# Patient Record
Sex: FEMALE | Race: BLACK OR AFRICAN AMERICAN | Employment: FULL TIME | URBAN - METROPOLITAN AREA
[De-identification: names, ages, dates, MRNs, and addresses within clinical notes are randomized per-mention and may not be internally consistent; named-entity substitution may affect disease eponyms.]

---

## 2018-02-21 PROBLEM — I10 HYPERTENSION: Status: ACTIVE | Noted: 2018-02-21

## 2018-02-21 PROBLEM — D62 ACUTE BLOOD LOSS ANEMIA: Status: ACTIVE | Noted: 2018-02-21

## 2018-02-21 PROBLEM — N92.1 MENOMETRORRHAGIA: Status: ACTIVE | Noted: 2018-02-21

## 2019-07-05 ENCOUNTER — HOSPITAL ENCOUNTER (OUTPATIENT)
Age: 44
Discharge: HOME | End: 2019-07-05
Payer: MEDICAID

## 2019-07-05 ENCOUNTER — HOSPITAL ENCOUNTER (OUTPATIENT)
Dept: GENERAL RADIOLOGY | Age: 44
Discharge: HOME | End: 2019-07-07
Payer: MEDICAID

## 2019-07-05 DIAGNOSIS — Z01.818 PREOP EXAMINATION: ICD-10-CM

## 2019-07-05 LAB
ALBUMIN SERPL-MCNC: 4.3 G/DL (ref 3.5–5.2)
ALP BLD-CCNC: 75 U/L (ref 35–104)
ALT SERPL-CCNC: 20 U/L (ref 0–32)
ANION GAP SERPL CALCULATED.3IONS-SCNC: 13 MMOL/L (ref 7–16)
APTT: 37.1 SEC (ref 24.5–35.1)
AST SERPL-CCNC: 17 U/L (ref 0–31)
BACTERIA: ABNORMAL /HPF
BILIRUB SERPL-MCNC: 0.3 MG/DL (ref 0–1.2)
BILIRUBIN URINE: NEGATIVE
BLOOD, URINE: ABNORMAL
BUN BLDV-MCNC: 5 MG/DL (ref 6–20)
CALCIUM SERPL-MCNC: 10.4 MG/DL (ref 8.6–10.2)
CHLORIDE BLD-SCNC: 107 MMOL/L (ref 98–107)
CLARITY: CLEAR
CO2: 21 MMOL/L (ref 22–29)
COLOR: YELLOW
CREAT SERPL-MCNC: 0.7 MG/DL (ref 0.5–1)
CRYSTALS, UA: ABNORMAL
EPITHELIAL CELLS, UA: ABNORMAL /HPF
GFR AFRICAN AMERICAN: >60
GFR NON-AFRICAN AMERICAN: >60 ML/MIN/1.73
GLUCOSE FASTING: 101 MG/DL (ref 74–99)
GLUCOSE URINE: NEGATIVE MG/DL
HCT VFR BLD CALC: 40.2 % (ref 34–48)
HEMOGLOBIN: 12.2 G/DL (ref 11.5–15.5)
INR BLD: 1
KETONES, URINE: NEGATIVE MG/DL
LEUKOCYTE ESTERASE, URINE: NEGATIVE
MCH RBC QN AUTO: 25.3 PG (ref 26–35)
MCHC RBC AUTO-ENTMCNC: 30.3 % (ref 32–34.5)
MCV RBC AUTO: 83.2 FL (ref 80–99.9)
NITRITE, URINE: NEGATIVE
PDW BLD-RTO: 19.2 FL (ref 11.5–15)
PH UA: 6 (ref 5–9)
PLATELET # BLD: 247 E9/L (ref 130–450)
PMV BLD AUTO: 11.4 FL (ref 7–12)
POTASSIUM SERPL-SCNC: 3.8 MMOL/L (ref 3.5–5)
PROTEIN UA: ABNORMAL MG/DL
PROTHROMBIN TIME: 11.4 SEC (ref 9.3–12.4)
RBC # BLD: 4.83 E12/L (ref 3.5–5.5)
RBC UA: ABNORMAL /HPF (ref 0–2)
SODIUM BLD-SCNC: 141 MMOL/L (ref 132–146)
SPECIFIC GRAVITY UA: 1.02 (ref 1–1.03)
TOTAL PROTEIN: 7.8 G/DL (ref 6.4–8.3)
UROBILINOGEN, URINE: 0.2 E.U./DL
WBC # BLD: 4.8 E9/L (ref 4.5–11.5)
WBC UA: ABNORMAL /HPF (ref 0–5)

## 2019-07-05 PROCEDURE — 81001 URINALYSIS AUTO W/SCOPE: CPT

## 2019-07-05 PROCEDURE — 85027 COMPLETE CBC AUTOMATED: CPT

## 2019-07-05 PROCEDURE — 85730 THROMBOPLASTIN TIME PARTIAL: CPT

## 2019-07-05 PROCEDURE — 85610 PROTHROMBIN TIME: CPT

## 2019-07-05 PROCEDURE — 71046 X-RAY EXAM CHEST 2 VIEWS: CPT

## 2019-07-05 PROCEDURE — 80053 COMPREHEN METABOLIC PANEL: CPT

## 2019-07-05 PROCEDURE — 36415 COLL VENOUS BLD VENIPUNCTURE: CPT

## 2019-07-15 ENCOUNTER — HOSPITAL ENCOUNTER (EMERGENCY)
Age: 44
Discharge: HOME | DRG: 519 | End: 2019-07-15
Payer: MEDICAID

## 2019-07-15 VITALS
SYSTOLIC BLOOD PRESSURE: 153 MMHG | OXYGEN SATURATION: 100 % | BODY MASS INDEX: 24.09 KG/M2 | RESPIRATION RATE: 17 BRPM | HEART RATE: 51 BPM | DIASTOLIC BLOOD PRESSURE: 81 MMHG | WEIGHT: 168.25 LBS | TEMPERATURE: 99.6 F | HEIGHT: 70 IN

## 2019-07-15 DIAGNOSIS — R10.30 LOWER ABDOMINAL PAIN: Primary | ICD-10-CM

## 2019-07-15 LAB
ALBUMIN SERPL-MCNC: 4 G/DL (ref 3.5–5.2)
ALP BLD-CCNC: 66 U/L (ref 35–104)
ALT SERPL-CCNC: 16 U/L (ref 0–32)
ANION GAP SERPL CALCULATED.3IONS-SCNC: 12 MMOL/L (ref 7–16)
AST SERPL-CCNC: 18 U/L (ref 0–31)
BACTERIA: ABNORMAL /HPF
BASOPHILS ABSOLUTE: 0.03 E9/L (ref 0–0.2)
BASOPHILS RELATIVE PERCENT: 0.6 % (ref 0–2)
BILIRUB SERPL-MCNC: <0.2 MG/DL (ref 0–1.2)
BILIRUBIN URINE: NEGATIVE
BLOOD, URINE: ABNORMAL
BUN BLDV-MCNC: 6 MG/DL (ref 6–20)
CALCIUM SERPL-MCNC: 10.1 MG/DL (ref 8.6–10.2)
CHLORIDE BLD-SCNC: 106 MMOL/L (ref 98–107)
CLARITY: CLEAR
CO2: 21 MMOL/L (ref 22–29)
COLOR: YELLOW
CREAT SERPL-MCNC: 0.8 MG/DL (ref 0.5–1)
EOSINOPHILS ABSOLUTE: 0.19 E9/L (ref 0.05–0.5)
EOSINOPHILS RELATIVE PERCENT: 3.5 % (ref 0–6)
EPITHELIAL CELLS, UA: ABNORMAL /HPF
GFR AFRICAN AMERICAN: >60
GFR NON-AFRICAN AMERICAN: >60 ML/MIN/1.73
GLUCOSE BLD-MCNC: 110 MG/DL (ref 74–99)
GLUCOSE URINE: NEGATIVE MG/DL
HCG(URINE) PREGNANCY TEST: NEGATIVE
HCT VFR BLD CALC: 38.2 % (ref 34–48)
HEMOGLOBIN: 11.6 G/DL (ref 11.5–15.5)
IMMATURE GRANULOCYTES #: 0.01 E9/L
IMMATURE GRANULOCYTES %: 0.2 % (ref 0–5)
KETONES, URINE: NEGATIVE MG/DL
LACTIC ACID: 2.9 MMOL/L (ref 0.5–2.2)
LEUKOCYTE ESTERASE, URINE: ABNORMAL
LIPASE: 23 U/L (ref 13–60)
LYMPHOCYTES ABSOLUTE: 1.93 E9/L (ref 1.5–4)
LYMPHOCYTES RELATIVE PERCENT: 35.7 % (ref 20–42)
MCH RBC QN AUTO: 26.1 PG (ref 26–35)
MCHC RBC AUTO-ENTMCNC: 30.4 % (ref 32–34.5)
MCV RBC AUTO: 85.8 FL (ref 80–99.9)
MONOCYTES ABSOLUTE: 0.41 E9/L (ref 0.1–0.95)
MONOCYTES RELATIVE PERCENT: 7.6 % (ref 2–12)
NEUTROPHILS ABSOLUTE: 2.83 E9/L (ref 1.8–7.3)
NEUTROPHILS RELATIVE PERCENT: 52.4 % (ref 43–80)
NITRITE, URINE: NEGATIVE
PDW BLD-RTO: 19.6 FL (ref 11.5–15)
PH UA: 6 (ref 5–9)
PLATELET # BLD: 280 E9/L (ref 130–450)
PMV BLD AUTO: 11.3 FL (ref 7–12)
POTASSIUM SERPL-SCNC: 3.8 MMOL/L (ref 3.5–5)
PROTEIN UA: ABNORMAL MG/DL
RBC # BLD: 4.45 E12/L (ref 3.5–5.5)
RBC UA: ABNORMAL /HPF (ref 0–2)
SODIUM BLD-SCNC: 139 MMOL/L (ref 132–146)
SPECIFIC GRAVITY UA: 1.01 (ref 1–1.03)
TOTAL PROTEIN: 7.3 G/DL (ref 6.4–8.3)
UROBILINOGEN, URINE: 0.2 E.U./DL
WBC # BLD: 5.4 E9/L (ref 4.5–11.5)
WBC UA: ABNORMAL /HPF (ref 0–5)

## 2019-07-15 PROCEDURE — 81001 URINALYSIS AUTO W/SCOPE: CPT

## 2019-07-15 PROCEDURE — 85025 COMPLETE CBC W/AUTO DIFF WBC: CPT

## 2019-07-15 PROCEDURE — 80053 COMPREHEN METABOLIC PANEL: CPT

## 2019-07-15 PROCEDURE — 99284 EMERGENCY DEPT VISIT MOD MDM: CPT

## 2019-07-15 PROCEDURE — 83690 ASSAY OF LIPASE: CPT

## 2019-07-15 PROCEDURE — 96374 THER/PROPH/DIAG INJ IV PUSH: CPT

## 2019-07-15 PROCEDURE — 83605 ASSAY OF LACTIC ACID: CPT

## 2019-07-15 PROCEDURE — 36415 COLL VENOUS BLD VENIPUNCTURE: CPT

## 2019-07-15 PROCEDURE — 6360000002 HC RX W HCPCS: Performed by: NURSE PRACTITIONER

## 2019-07-15 PROCEDURE — 2580000003 HC RX 258: Performed by: NURSE PRACTITIONER

## 2019-07-15 PROCEDURE — 81025 URINE PREGNANCY TEST: CPT

## 2019-07-15 RX ORDER — 0.9 % SODIUM CHLORIDE 0.9 %
1000 INTRAVENOUS SOLUTION INTRAVENOUS ONCE
Status: COMPLETED | OUTPATIENT
Start: 2019-07-15 | End: 2019-07-15

## 2019-07-15 RX ORDER — KETOROLAC TROMETHAMINE 15 MG/ML
15 INJECTION, SOLUTION INTRAMUSCULAR; INTRAVENOUS ONCE
Status: COMPLETED | OUTPATIENT
Start: 2019-07-15 | End: 2019-07-15

## 2019-07-15 RX ADMIN — KETOROLAC TROMETHAMINE 15 MG: 15 INJECTION, SOLUTION INTRAMUSCULAR; INTRAVENOUS at 16:47

## 2019-07-15 RX ADMIN — SODIUM CHLORIDE 1000 ML: 9 INJECTION, SOLUTION INTRAVENOUS at 16:47

## 2019-07-15 ASSESSMENT — PAIN SCALES - GENERAL
PAINLEVEL_OUTOF10: 7
PAINLEVEL_OUTOF10: 5
PAINLEVEL_OUTOF10: 9

## 2019-07-15 ASSESSMENT — PAIN DESCRIPTION - PAIN TYPE: TYPE: ACUTE PAIN

## 2019-07-15 ASSESSMENT — PAIN DESCRIPTION - ORIENTATION: ORIENTATION: LOWER

## 2019-07-15 ASSESSMENT — PAIN DESCRIPTION - ONSET: ONSET: ON-GOING

## 2019-07-15 ASSESSMENT — PAIN DESCRIPTION - FREQUENCY: FREQUENCY: CONTINUOUS

## 2019-07-15 ASSESSMENT — PAIN DESCRIPTION - DESCRIPTORS: DESCRIPTORS: DISCOMFORT

## 2019-07-15 ASSESSMENT — PAIN - FUNCTIONAL ASSESSMENT: PAIN_FUNCTIONAL_ASSESSMENT: PREVENTS OR INTERFERES SOME ACTIVE ACTIVITIES AND ADLS

## 2019-07-15 ASSESSMENT — PAIN DESCRIPTION - LOCATION: LOCATION: ABDOMEN;BACK

## 2019-07-15 NOTE — ED PROVIDER NOTES
[facility]    Department of Emergency Medicine   ED  Provider Note  Admit Date/RoomTime: 7/15/2019  4:14 PM  ED Room: 25/25  MRN: [id number]  Chief Complaint       Abdominal Pain (lower adb pain past several days radiates to back and left leg no vomiting or diarrhea scheduled for hyster 7 18 by dr Bubba Bernabe)    History of Present Illness   Source of history provided by:  patient. History/Exam Limitations: none. Celine Max is a 37 y.o. old female who has a past medical history of:   Past Medical History:   Diagnosis Date    Anemia     has had transfusions, iron infusions 3/2015    Anxiety     Chronic lower back pain     H/O exercise stress test 2012    not sure of date, at [facility] neg    History of blood transfusion     at [facility]    Hypertension     Pancreatitis 2015    treated by Dr Andres Robbins, [facility]    presents to the emergency department by private vehicle, for complaints of still present aching pain in the lower abdomen with radiation to her back and left leg which began 8 month(s) prior to arrival.   There has been similar episodes in the past with uterine fibroids and abnormal uterine bleeding. Patient states that she is scheduled for a hysterectomy with her gynecologist in 3 days. Since onset the symptoms have been worsening. The pain is associated with nothing pertinent. The pain is aggravated by none and relieved by nothing. There has been NO chills, cloudy urine, constipation, diarrhea, dysuria, headache, hematuria, sweating, urinary frequency, urinary incontinence, urinary urgency, vaginal discharge, vaginal itching, vomiting or fever. Her CT of the abdomen from 2/21/2018 was reviewed. GYN History: irregular periods. STD History: no history of PID, STD's. No LMP recorded. Patient has had an injection. Current Pregnancy: Unknown. Birth Control: None. Gravid Status: No obstetric history on file.     .  ROS   Pertinent positives and negatives are stated within HPI, all other systems reviewed and are negative. Past Surgical History:   Procedure Laterality Date    OTHER SURGICAL HISTORY  11/17/2016    Diagnostic Laparoscopy and Lysis of Adhesions    TRANSTHORACIC ECHOCARDIOGRAM  12/09/2015    EF 61 %  normal results    TUBAL LIGATION  2010   Social History:  reports that she has been smoking cigarettes. She has been smoking about 0.50 packs per day. She has never used smokeless tobacco. She reports that she drinks alcohol. She reports that she has current or past drug history. Drug: Marijuana. Family History: family history includes Cancer in her father; Diabetes in her mother; High Blood Pressure in her mother. Allergies: Morphine    Physical Exam           ED Triage Vitals [07/15/19 1556]   BP Temp Temp Source Pulse Resp SpO2 Height Weight   (!) 152/80 99.6 °F (37.6 °C) Oral 78 16 99 % 5' 10\" (1.778 m) 168 lb 4 oz (76.3 kg)      Oxygen Saturation Interpretation: Normal.    · General Appearance/Constitutional:  Alert, development consistent with age. · HEENT:  NC/NT. PERRLA. Airway patent. · Neck:  Supple. No lymphadenopathy. · Respiratory:  No retractions. Lungs Clear to auscultation and breath sounds equal.  · CV:  Regular rate and rhythm. · GI:  General Appearance: normal.         Bowel sounds: normal bowel sounds. Distension:  None. Tenderness: mild tenderness is present in the lower abdomen. Liver: non-tender. Spleen:  non-palpable and non-tender. Pulsatile Mass: absent. Hernia:  no inguinal or femoral hernias noted. · Back: CVA Tenderness: No.  · : Deferred  · Integument:  Normal turgor. Warm, dry, without visible rash, unless noted elsewhere. · Lymphatics: No edema, cap.refill <3sec. · Neurological:  Orientation age-appropriate. Motor functions intact.     Lab / Imaging Results   (All laboratory and radiology results have been personally reviewed by myself)  Labs:  Results for orders placed or

## 2019-07-17 ENCOUNTER — ANESTHESIA EVENT (OUTPATIENT)
Dept: OPERATING ROOM | Age: 44
DRG: 519 | End: 2019-07-17
Payer: MEDICAID

## 2019-07-17 NOTE — PROGRESS NOTES
[facility]                                                                                                                    PRE OP INSTRUCTIONS FOR  Sabino Snow        Date: 7/17/2019    Date of surgery: 7/18/2019   Arrival Time: Hospital will call you between 5pm and 7pm with your final arrival time for surgery    1. Do not eat or drink anything after midnight prior to surgery. This includes no water, chewing gum, mints or ice chips. 2. Take the following medications with a small sip of water on the morning of Surgery: none     3. Diabetics may take evening dose of insulin but none after midnight. If you feel symptomatic or low blood sugar morning of surgery drink 1-2 ounces of apple juice only. 4. Aspirin, Ibuprofen, Advil, Naproxen, Vitamin E and other Anti-inflammatory products should be stopped  before surgery  as directed by your physician. Take Tylenol only unless instructed otherwise by your surgeon. 5. Check with your Doctor regarding stopping Plavix, Coumadin, Lovenox, Eliquis, Effient, or other blood thinners. 6. Do not smoke,use illicit drugs and do not drink any alcoholic beverages 24 hours prior to surgery. 7. You may brush your teeth the morning of surgery. DO NOT SWALLOW WATER    8. You MUST make arrangements for a responsible adult to take you home after your surgery. You will not be allowed to leave alone or drive yourself home. It is strongly suggested someone stay with you the first 24 hrs. Your surgery will be cancelled if you do not have a ride home. 9. PEDIATRIC PATIENTS ONLY:  A parent/legal guardian must accompany a child scheduled for surgery and plan to stay at the hospital until the child is discharged. Please do not bring other children with you.     10. Please wear simple, loose fitting clothing to the hospital.  Reche Baseman not bring valuables (money, credit cards, checkbooks, etc.) Do not wear any makeup (including no eye makeup) or nail

## 2019-07-17 NOTE — H&P
Department of Gynecology  Attending Pre-operative History and Physical      Jae Martinez  7/17/2019  [id number]        CHIEF COMPLAINT:   Excessive bleeding and pain    History obtained from patient    HISTORY OF PRESENT ILLNESS:                      37 y.o. female with   history of excessive pain and bleeding who presents with enlargement of uterus with fibroids and now scheduled for SERGEI with BS The patient had thorough counselling about the procedure, the advantages and disadvantages complications consequences short-term-long-term.,all the questions were answered to her satisfaction. Pt desires to proceed with surgery as scheduled      Past Medical History:        Diagnosis Date    Anemia     has had transfusions, iron infusions 3/2015    Anxiety     Chronic lower back pain     H/O exercise stress test 2012    not sure of date, at [facility], neg    History of blood transfusion     at [facility]    Hypertension     Pancreatitis 2015    treated by Dr Carrol Payne, [facility]       Past Surgical History:        Procedure Laterality Date    OTHER SURGICAL HISTORY  11/17/2016    Diagnostic Laparoscopy and Lysis of Adhesions    TRANSTHORACIC ECHOCARDIOGRAM  12/09/2015    EF 61 %  normal results    TUBAL LIGATION  2010       Past Gynecological History:    1. Last menstrual period: 2wks ago  2. Menses: heavy,clotting  3. Pap History: normal Date: 05/19                    OB History   No data available       5 FTDels  Medications Prior to Admission:   No medications prior to admission.     Allergies:  Morphine     Social History:  Social History     Socioeconomic History    Marital status: Single     Spouse name: Not on file    Number of children: Not on file    Years of education: Not on file    Highest education level: Not on file   Occupational History    Not on file   Social Needs    Financial resource strain: Not on file    Food insecurity:     Worry: Not on file     Inability: Not on file    Transportation needs: complications and side effects.         Electronically signed by Alvin Gasca MD on 7/17/2019 at 6:04 PM

## 2019-07-18 ENCOUNTER — HOSPITAL ENCOUNTER (INPATIENT)
Age: 44
LOS: 2 days | Discharge: HOME | DRG: 519 | End: 2019-07-20
Attending: OBSTETRICS & GYNECOLOGY | Admitting: OBSTETRICS & GYNECOLOGY
Payer: MEDICAID

## 2019-07-18 ENCOUNTER — ANESTHESIA (OUTPATIENT)
Dept: OPERATING ROOM | Age: 44
DRG: 519 | End: 2019-07-18
Payer: MEDICAID

## 2019-07-18 VITALS
TEMPERATURE: 95.7 F | SYSTOLIC BLOOD PRESSURE: 134 MMHG | OXYGEN SATURATION: 100 % | RESPIRATION RATE: 1 BRPM | DIASTOLIC BLOOD PRESSURE: 80 MMHG

## 2019-07-18 DIAGNOSIS — Z90.710 STATUS POST ABDOMINAL HYSTERECTOMY: Primary | ICD-10-CM

## 2019-07-18 LAB
ABO/RH: NORMAL
AMPHETAMINE SCREEN, URINE: NOT DETECTED
ANTIBODY SCREEN: NORMAL
BARBITURATE SCREEN URINE: NOT DETECTED
BENZODIAZEPINE SCREEN, URINE: NOT DETECTED
CANNABINOID SCREEN URINE: POSITIVE
COCAINE METABOLITE SCREEN URINE: NOT DETECTED
HCG(URINE) PREGNANCY TEST: NEGATIVE
METHADONE SCREEN, URINE: NOT DETECTED
OPIATE SCREEN URINE: NOT DETECTED
PHENCYCLIDINE SCREEN URINE: NOT DETECTED
PROPOXYPHENE SCREEN: NOT DETECTED

## 2019-07-18 PROCEDURE — 6360000002 HC RX W HCPCS: Performed by: OBSTETRICS & GYNECOLOGY

## 2019-07-18 PROCEDURE — 1200000000 HC SEMI PRIVATE

## 2019-07-18 PROCEDURE — 7100000000 HC PACU RECOVERY - FIRST 15 MIN: Performed by: OBSTETRICS & GYNECOLOGY

## 2019-07-18 PROCEDURE — 2580000003 HC RX 258: Performed by: ANESTHESIOLOGY

## 2019-07-18 PROCEDURE — 0UT90ZZ RESECTION OF UTERUS, OPEN APPROACH: ICD-10-PCS | Performed by: OBSTETRICS & GYNECOLOGY

## 2019-07-18 PROCEDURE — 86850 RBC ANTIBODY SCREEN: CPT

## 2019-07-18 PROCEDURE — 2580000003 HC RX 258: Performed by: OBSTETRICS & GYNECOLOGY

## 2019-07-18 PROCEDURE — 6360000002 HC RX W HCPCS

## 2019-07-18 PROCEDURE — 86900 BLOOD TYPING SEROLOGIC ABO: CPT

## 2019-07-18 PROCEDURE — 3600000004 HC SURGERY LEVEL 4 BASE: Performed by: OBSTETRICS & GYNECOLOGY

## 2019-07-18 PROCEDURE — 93005 ELECTROCARDIOGRAM TRACING: CPT | Performed by: ANESTHESIOLOGY

## 2019-07-18 PROCEDURE — 86901 BLOOD TYPING SEROLOGIC RH(D): CPT

## 2019-07-18 PROCEDURE — 2500000003 HC RX 250 WO HCPCS: Performed by: ANESTHESIOLOGY

## 2019-07-18 PROCEDURE — 2720000010 HC SURG SUPPLY STERILE: Performed by: OBSTETRICS & GYNECOLOGY

## 2019-07-18 PROCEDURE — 3600000014 HC SURGERY LEVEL 4 ADDTL 15MIN: Performed by: OBSTETRICS & GYNECOLOGY

## 2019-07-18 PROCEDURE — 81025 URINE PREGNANCY TEST: CPT

## 2019-07-18 PROCEDURE — 0UT70ZZ RESECTION OF BILATERAL FALLOPIAN TUBES, OPEN APPROACH: ICD-10-PCS | Performed by: OBSTETRICS & GYNECOLOGY

## 2019-07-18 PROCEDURE — 2500000003 HC RX 250 WO HCPCS

## 2019-07-18 PROCEDURE — 36415 COLL VENOUS BLD VENIPUNCTURE: CPT

## 2019-07-18 PROCEDURE — 3700000000 HC ANESTHESIA ATTENDED CARE: Performed by: OBSTETRICS & GYNECOLOGY

## 2019-07-18 PROCEDURE — 6370000000 HC RX 637 (ALT 250 FOR IP): Performed by: OBSTETRICS & GYNECOLOGY

## 2019-07-18 PROCEDURE — 7100000001 HC PACU RECOVERY - ADDTL 15 MIN: Performed by: OBSTETRICS & GYNECOLOGY

## 2019-07-18 PROCEDURE — G0480 DRUG TEST DEF 1-7 CLASSES: HCPCS

## 2019-07-18 PROCEDURE — 2709999900 HC NON-CHARGEABLE SUPPLY: Performed by: OBSTETRICS & GYNECOLOGY

## 2019-07-18 PROCEDURE — 88307 TISSUE EXAM BY PATHOLOGIST: CPT

## 2019-07-18 PROCEDURE — 6360000002 HC RX W HCPCS: Performed by: ANESTHESIOLOGY

## 2019-07-18 PROCEDURE — 3700000001 HC ADD 15 MINUTES (ANESTHESIA): Performed by: OBSTETRICS & GYNECOLOGY

## 2019-07-18 PROCEDURE — 80307 DRUG TEST PRSMV CHEM ANLYZR: CPT

## 2019-07-18 RX ORDER — NEOSTIGMINE METHYLSULFATE 1 MG/ML
INJECTION, SOLUTION INTRAVENOUS PRN
Status: DISCONTINUED | OUTPATIENT
Start: 2019-07-18 | End: 2019-07-18 | Stop reason: SDUPTHER

## 2019-07-18 RX ORDER — MEPERIDINE HYDROCHLORIDE 25 MG/ML
12.5 INJECTION INTRAMUSCULAR; INTRAVENOUS; SUBCUTANEOUS EVERY 5 MIN PRN
Status: DISCONTINUED | OUTPATIENT
Start: 2019-07-18 | End: 2019-07-18 | Stop reason: HOSPADM

## 2019-07-18 RX ORDER — SODIUM CHLORIDE, SODIUM LACTATE, POTASSIUM CHLORIDE, CALCIUM CHLORIDE 600; 310; 30; 20 MG/100ML; MG/100ML; MG/100ML; MG/100ML
INJECTION, SOLUTION INTRAVENOUS CONTINUOUS
Status: DISCONTINUED | OUTPATIENT
Start: 2019-07-18 | End: 2019-07-20

## 2019-07-18 RX ORDER — FENTANYL CITRATE 50 UG/ML
INJECTION, SOLUTION INTRAMUSCULAR; INTRAVENOUS PRN
Status: DISCONTINUED | OUTPATIENT
Start: 2019-07-18 | End: 2019-07-18 | Stop reason: SDUPTHER

## 2019-07-18 RX ORDER — SODIUM CHLORIDE 0.9 % (FLUSH) 0.9 %
10 SYRINGE (ML) INJECTION PRN
Status: DISCONTINUED | OUTPATIENT
Start: 2019-07-18 | End: 2019-07-18 | Stop reason: HOSPADM

## 2019-07-18 RX ORDER — PROPOFOL 10 MG/ML
INJECTION, EMULSION INTRAVENOUS PRN
Status: DISCONTINUED | OUTPATIENT
Start: 2019-07-18 | End: 2019-07-18 | Stop reason: SDUPTHER

## 2019-07-18 RX ORDER — ONDANSETRON 2 MG/ML
INJECTION INTRAMUSCULAR; INTRAVENOUS PRN
Status: DISCONTINUED | OUTPATIENT
Start: 2019-07-18 | End: 2019-07-18 | Stop reason: SDUPTHER

## 2019-07-18 RX ORDER — HYDROMORPHONE HYDROCHLORIDE 1 MG/ML
0.5 INJECTION, SOLUTION INTRAMUSCULAR; INTRAVENOUS; SUBCUTANEOUS EVERY 5 MIN PRN
Status: COMPLETED | OUTPATIENT
Start: 2019-07-18 | End: 2019-07-18

## 2019-07-18 RX ORDER — SODIUM CHLORIDE 0.9 % (FLUSH) 0.9 %
10 SYRINGE (ML) INJECTION EVERY 12 HOURS SCHEDULED
Status: DISCONTINUED | OUTPATIENT
Start: 2019-07-18 | End: 2019-07-20 | Stop reason: HOSPADM

## 2019-07-18 RX ORDER — FENTANYL CITRATE 50 UG/ML
INJECTION, SOLUTION INTRAMUSCULAR; INTRAVENOUS
Status: DISPENSED
Start: 2019-07-18 | End: 2019-07-18

## 2019-07-18 RX ORDER — GLYCOPYRROLATE 1 MG/5 ML
SYRINGE (ML) INTRAVENOUS PRN
Status: DISCONTINUED | OUTPATIENT
Start: 2019-07-18 | End: 2019-07-18 | Stop reason: SDUPTHER

## 2019-07-18 RX ORDER — MIDAZOLAM HYDROCHLORIDE 1 MG/ML
INJECTION INTRAMUSCULAR; INTRAVENOUS PRN
Status: DISCONTINUED | OUTPATIENT
Start: 2019-07-18 | End: 2019-07-18 | Stop reason: SDUPTHER

## 2019-07-18 RX ORDER — ONDANSETRON 2 MG/ML
4 INJECTION INTRAMUSCULAR; INTRAVENOUS EVERY 8 HOURS PRN
Status: DISCONTINUED | OUTPATIENT
Start: 2019-07-18 | End: 2019-07-20 | Stop reason: HOSPADM

## 2019-07-18 RX ORDER — SODIUM CHLORIDE 0.9 % (FLUSH) 0.9 %
10 SYRINGE (ML) INJECTION EVERY 12 HOURS SCHEDULED
Status: DISCONTINUED | OUTPATIENT
Start: 2019-07-18 | End: 2019-07-18 | Stop reason: HOSPADM

## 2019-07-18 RX ORDER — OXYCODONE HYDROCHLORIDE AND ACETAMINOPHEN 5; 325 MG/1; MG/1
2 TABLET ORAL EVERY 4 HOURS PRN
Status: DISCONTINUED | OUTPATIENT
Start: 2019-07-18 | End: 2019-07-19

## 2019-07-18 RX ORDER — HYDROMORPHONE HYDROCHLORIDE 1 MG/ML
0.25 INJECTION, SOLUTION INTRAMUSCULAR; INTRAVENOUS; SUBCUTANEOUS EVERY 5 MIN PRN
Status: DISCONTINUED | OUTPATIENT
Start: 2019-07-18 | End: 2019-07-18 | Stop reason: HOSPADM

## 2019-07-18 RX ORDER — IBUPROFEN 800 MG/1
800 TABLET ORAL EVERY 6 HOURS PRN
Status: DISCONTINUED | OUTPATIENT
Start: 2019-07-18 | End: 2019-07-20 | Stop reason: HOSPADM

## 2019-07-18 RX ORDER — LIDOCAINE HYDROCHLORIDE 20 MG/ML
INJECTION, SOLUTION INTRAVENOUS PRN
Status: DISCONTINUED | OUTPATIENT
Start: 2019-07-18 | End: 2019-07-18 | Stop reason: SDUPTHER

## 2019-07-18 RX ORDER — DEXAMETHASONE SODIUM PHOSPHATE 4 MG/ML
INJECTION, SOLUTION INTRA-ARTICULAR; INTRALESIONAL; INTRAMUSCULAR; INTRAVENOUS; SOFT TISSUE PRN
Status: DISCONTINUED | OUTPATIENT
Start: 2019-07-18 | End: 2019-07-18 | Stop reason: SDUPTHER

## 2019-07-18 RX ORDER — MEPERIDINE HYDROCHLORIDE 25 MG/ML
INJECTION INTRAMUSCULAR; INTRAVENOUS; SUBCUTANEOUS
Status: DISPENSED
Start: 2019-07-18 | End: 2019-07-18

## 2019-07-18 RX ORDER — FENTANYL CITRATE 50 UG/ML
50 INJECTION, SOLUTION INTRAMUSCULAR; INTRAVENOUS EVERY 5 MIN PRN
Status: DISCONTINUED | OUTPATIENT
Start: 2019-07-18 | End: 2019-07-18 | Stop reason: HOSPADM

## 2019-07-18 RX ORDER — ROCURONIUM BROMIDE 10 MG/ML
INJECTION, SOLUTION INTRAVENOUS PRN
Status: DISCONTINUED | OUTPATIENT
Start: 2019-07-18 | End: 2019-07-18 | Stop reason: SDUPTHER

## 2019-07-18 RX ORDER — HYDROMORPHONE HYDROCHLORIDE 1 MG/ML
0.5 INJECTION, SOLUTION INTRAMUSCULAR; INTRAVENOUS; SUBCUTANEOUS
Status: DISCONTINUED | OUTPATIENT
Start: 2019-07-18 | End: 2019-07-19

## 2019-07-18 RX ORDER — NALOXONE HYDROCHLORIDE 0.4 MG/ML
0.4 INJECTION, SOLUTION INTRAMUSCULAR; INTRAVENOUS; SUBCUTANEOUS PRN
Status: DISCONTINUED | OUTPATIENT
Start: 2019-07-18 | End: 2019-07-19

## 2019-07-18 RX ORDER — SODIUM CHLORIDE, SODIUM LACTATE, POTASSIUM CHLORIDE, CALCIUM CHLORIDE 600; 310; 30; 20 MG/100ML; MG/100ML; MG/100ML; MG/100ML
INJECTION, SOLUTION INTRAVENOUS CONTINUOUS
Status: DISCONTINUED | OUTPATIENT
Start: 2019-07-18 | End: 2019-07-19

## 2019-07-18 RX ORDER — SODIUM CHLORIDE 0.9 % (FLUSH) 0.9 %
10 SYRINGE (ML) INJECTION PRN
Status: DISCONTINUED | OUTPATIENT
Start: 2019-07-18 | End: 2019-07-20 | Stop reason: HOSPADM

## 2019-07-18 RX ORDER — OXYCODONE HYDROCHLORIDE AND ACETAMINOPHEN 5; 325 MG/1; MG/1
1 TABLET ORAL EVERY 4 HOURS PRN
Status: DISCONTINUED | OUTPATIENT
Start: 2019-07-18 | End: 2019-07-19

## 2019-07-18 RX ADMIN — Medication 6 MG: at 22:36

## 2019-07-18 RX ADMIN — SODIUM CHLORIDE, POTASSIUM CHLORIDE, SODIUM LACTATE AND CALCIUM CHLORIDE: 600; 310; 30; 20 INJECTION, SOLUTION INTRAVENOUS at 08:51

## 2019-07-18 RX ADMIN — DEXAMETHASONE SODIUM PHOSPHATE 10 MG: 4 INJECTION, SOLUTION INTRA-ARTICULAR; INTRALESIONAL; INTRAMUSCULAR; INTRAVENOUS; SOFT TISSUE at 07:47

## 2019-07-18 RX ADMIN — HYDROMORPHONE HYDROCHLORIDE 0.5 MG: 1 INJECTION, SOLUTION INTRAMUSCULAR; INTRAVENOUS; SUBCUTANEOUS at 11:56

## 2019-07-18 RX ADMIN — FENTANYL CITRATE 50 MCG: 50 INJECTION, SOLUTION INTRAMUSCULAR; INTRAVENOUS at 08:35

## 2019-07-18 RX ADMIN — HYDROMORPHONE HYDROCHLORIDE 0.5 MG: 1 INJECTION, SOLUTION INTRAMUSCULAR; INTRAVENOUS; SUBCUTANEOUS at 11:15

## 2019-07-18 RX ADMIN — MIDAZOLAM 2 MG: 1 INJECTION INTRAMUSCULAR; INTRAVENOUS at 07:32

## 2019-07-18 RX ADMIN — HYDROMORPHONE HYDROCHLORIDE 0.5 MG: 1 INJECTION, SOLUTION INTRAMUSCULAR; INTRAVENOUS; SUBCUTANEOUS at 11:10

## 2019-07-18 RX ADMIN — SODIUM CHLORIDE, POTASSIUM CHLORIDE, SODIUM LACTATE AND CALCIUM CHLORIDE: 600; 310; 30; 20 INJECTION, SOLUTION INTRAVENOUS at 11:55

## 2019-07-18 RX ADMIN — FENTANYL CITRATE 50 MCG: 50 INJECTION, SOLUTION INTRAMUSCULAR; INTRAVENOUS at 08:06

## 2019-07-18 RX ADMIN — Medication 0.6 MG: at 09:29

## 2019-07-18 RX ADMIN — HYDROMORPHONE HYDROCHLORIDE 0.5 MG: 1 INJECTION, SOLUTION INTRAMUSCULAR; INTRAVENOUS; SUBCUTANEOUS at 11:00

## 2019-07-18 RX ADMIN — SODIUM CHLORIDE, POTASSIUM CHLORIDE, SODIUM LACTATE AND CALCIUM CHLORIDE: 600; 310; 30; 20 INJECTION, SOLUTION INTRAVENOUS at 07:31

## 2019-07-18 RX ADMIN — HYDROMORPHONE HYDROCHLORIDE 0.5 MG: 1 INJECTION, SOLUTION INTRAMUSCULAR; INTRAVENOUS; SUBCUTANEOUS at 10:45

## 2019-07-18 RX ADMIN — FENTANYL CITRATE 50 MCG: 50 INJECTION, SOLUTION INTRAMUSCULAR; INTRAVENOUS at 10:05

## 2019-07-18 RX ADMIN — FENTANYL CITRATE 100 MCG: 50 INJECTION, SOLUTION INTRAMUSCULAR; INTRAVENOUS at 09:39

## 2019-07-18 RX ADMIN — MEPERIDINE HYDROCHLORIDE 12.5 MG: 25 INJECTION, SOLUTION INTRAMUSCULAR; INTRAVENOUS; SUBCUTANEOUS at 10:23

## 2019-07-18 RX ADMIN — FENTANYL CITRATE 50 MCG: 50 INJECTION, SOLUTION INTRAMUSCULAR; INTRAVENOUS at 09:35

## 2019-07-18 RX ADMIN — HYDROMORPHONE HYDROCHLORIDE 0.25 MG: 1 INJECTION, SOLUTION INTRAMUSCULAR; INTRAVENOUS; SUBCUTANEOUS at 10:20

## 2019-07-18 RX ADMIN — FENTANYL CITRATE 50 MCG: 50 INJECTION, SOLUTION INTRAMUSCULAR; INTRAVENOUS at 10:10

## 2019-07-18 RX ADMIN — OXYCODONE HYDROCHLORIDE AND ACETAMINOPHEN 2 TABLET: 5; 325 TABLET ORAL at 12:58

## 2019-07-18 RX ADMIN — MEPERIDINE HYDROCHLORIDE 12.5 MG: 25 INJECTION, SOLUTION INTRAMUSCULAR; INTRAVENOUS; SUBCUTANEOUS at 10:18

## 2019-07-18 RX ADMIN — LIDOCAINE HYDROCHLORIDE 80 MG: 20 INJECTION, SOLUTION INTRAVENOUS at 07:37

## 2019-07-18 RX ADMIN — FENTANYL CITRATE 100 MCG: 50 INJECTION, SOLUTION INTRAMUSCULAR; INTRAVENOUS at 07:37

## 2019-07-18 RX ADMIN — HYDROMORPHONE HYDROCHLORIDE 0.25 MG: 1 INJECTION, SOLUTION INTRAMUSCULAR; INTRAVENOUS; SUBCUTANEOUS at 10:15

## 2019-07-18 RX ADMIN — ONDANSETRON HYDROCHLORIDE 4 MG: 2 INJECTION, SOLUTION INTRAMUSCULAR; INTRAVENOUS at 09:20

## 2019-07-18 RX ADMIN — Medication 20 MG: at 07:54

## 2019-07-18 RX ADMIN — PROPOFOL 200 MG: 10 INJECTION, EMULSION INTRAVENOUS at 07:37

## 2019-07-18 RX ADMIN — Medication 2 G: at 07:32

## 2019-07-18 RX ADMIN — Medication 50 MG: at 07:37

## 2019-07-18 RX ADMIN — Medication 10 MG: at 08:56

## 2019-07-18 RX ADMIN — SODIUM CHLORIDE, POTASSIUM CHLORIDE, SODIUM LACTATE AND CALCIUM CHLORIDE: 600; 310; 30; 20 INJECTION, SOLUTION INTRAVENOUS at 06:20

## 2019-07-18 RX ADMIN — Medication 0.2 MG: at 16:50

## 2019-07-18 RX ADMIN — HYDROMORPHONE HYDROCHLORIDE 0.5 MG: 1 INJECTION, SOLUTION INTRAMUSCULAR; INTRAVENOUS; SUBCUTANEOUS at 14:57

## 2019-07-18 RX ADMIN — FENTANYL CITRATE 100 MCG: 50 INJECTION, SOLUTION INTRAMUSCULAR; INTRAVENOUS at 08:14

## 2019-07-18 RX ADMIN — Medication 3 MG: at 09:29

## 2019-07-18 ASSESSMENT — PULMONARY FUNCTION TESTS
PIF_VALUE: 3
PIF_VALUE: 18
PIF_VALUE: 2
PIF_VALUE: 19
PIF_VALUE: 18
PIF_VALUE: 18
PIF_VALUE: 16
PIF_VALUE: 2
PIF_VALUE: 17
PIF_VALUE: 18
PIF_VALUE: 17
PIF_VALUE: 16
PIF_VALUE: 2
PIF_VALUE: 16
PIF_VALUE: 18
PIF_VALUE: 1
PIF_VALUE: 29
PIF_VALUE: 18
PIF_VALUE: 16
PIF_VALUE: 1
PIF_VALUE: 18
PIF_VALUE: 18
PIF_VALUE: 19
PIF_VALUE: 0
PIF_VALUE: 22
PIF_VALUE: 17
PIF_VALUE: 2
PIF_VALUE: 18
PIF_VALUE: 14
PIF_VALUE: 18
PIF_VALUE: 18
PIF_VALUE: 3
PIF_VALUE: 18
PIF_VALUE: 16
PIF_VALUE: 22
PIF_VALUE: 15
PIF_VALUE: 18
PIF_VALUE: 18
PIF_VALUE: 16
PIF_VALUE: 18
PIF_VALUE: 19
PIF_VALUE: 18
PIF_VALUE: 18
PIF_VALUE: 14
PIF_VALUE: 28
PIF_VALUE: 2
PIF_VALUE: 18
PIF_VALUE: 19
PIF_VALUE: 17
PIF_VALUE: 16
PIF_VALUE: 16
PIF_VALUE: 18
PIF_VALUE: 0
PIF_VALUE: 17
PIF_VALUE: 18
PIF_VALUE: 16
PIF_VALUE: 17
PIF_VALUE: 17
PIF_VALUE: 18
PIF_VALUE: 16
PIF_VALUE: 18
PIF_VALUE: 14
PIF_VALUE: 2
PIF_VALUE: 18
PIF_VALUE: 15
PIF_VALUE: 2
PIF_VALUE: 0
PIF_VALUE: 18
PIF_VALUE: 17
PIF_VALUE: 18
PIF_VALUE: 24
PIF_VALUE: 2
PIF_VALUE: 13
PIF_VALUE: 18
PIF_VALUE: 17
PIF_VALUE: 17
PIF_VALUE: 18
PIF_VALUE: 16
PIF_VALUE: 18
PIF_VALUE: 17
PIF_VALUE: 16
PIF_VALUE: 0
PIF_VALUE: 18
PIF_VALUE: 18
PIF_VALUE: 16
PIF_VALUE: 16
PIF_VALUE: 17
PIF_VALUE: 18
PIF_VALUE: 0
PIF_VALUE: 18
PIF_VALUE: 0
PIF_VALUE: 2
PIF_VALUE: 1
PIF_VALUE: 18
PIF_VALUE: 2
PIF_VALUE: 18
PIF_VALUE: 16
PIF_VALUE: 17
PIF_VALUE: 18
PIF_VALUE: 18
PIF_VALUE: 0
PIF_VALUE: 18
PIF_VALUE: 16
PIF_VALUE: 16
PIF_VALUE: 19
PIF_VALUE: 17
PIF_VALUE: 18
PIF_VALUE: 2
PIF_VALUE: 18
PIF_VALUE: 16
PIF_VALUE: 14
PIF_VALUE: 16
PIF_VALUE: 16
PIF_VALUE: 18
PIF_VALUE: 17
PIF_VALUE: 18
PIF_VALUE: 18
PIF_VALUE: 17
PIF_VALUE: 18
PIF_VALUE: 16
PIF_VALUE: 17
PIF_VALUE: 18
PIF_VALUE: 18

## 2019-07-18 ASSESSMENT — PAIN SCALES - GENERAL
PAINLEVEL_OUTOF10: 10
PAINLEVEL_OUTOF10: 9
PAINLEVEL_OUTOF10: 9
PAINLEVEL_OUTOF10: 10
PAINLEVEL_OUTOF10: 9
PAINLEVEL_OUTOF10: 5
PAINLEVEL_OUTOF10: 6
PAINLEVEL_OUTOF10: 9
PAINLEVEL_OUTOF10: 10
PAINLEVEL_OUTOF10: 8
PAINLEVEL_OUTOF10: 6
PAINLEVEL_OUTOF10: 9
PAINLEVEL_OUTOF10: 8
PAINLEVEL_OUTOF10: 10
PAINLEVEL_OUTOF10: 6
PAINLEVEL_OUTOF10: 5
PAINLEVEL_OUTOF10: 10
PAINLEVEL_OUTOF10: 6
PAINLEVEL_OUTOF10: 10
PAINLEVEL_OUTOF10: 10
PAINLEVEL_OUTOF10: 0
PAINLEVEL_OUTOF10: 9

## 2019-07-18 ASSESSMENT — PAIN DESCRIPTION - DESCRIPTORS
DESCRIPTORS: CRAMPING;SHARP
DESCRIPTORS: ACHING;BURNING;CONSTANT
DESCRIPTORS: CONSTANT;SHARP;SHOOTING;STABBING
DESCRIPTORS: ACHING;BURNING;CONSTANT
DESCRIPTORS: ACHING
DESCRIPTORS: CONSTANT;SHARP;SHOOTING
DESCRIPTORS: CONSTANT;SHARP;SHOOTING;STABBING;THROBBING

## 2019-07-18 ASSESSMENT — PAIN DESCRIPTION - LOCATION
LOCATION: ABDOMEN;BACK
LOCATION: ABDOMEN

## 2019-07-18 ASSESSMENT — PAIN DESCRIPTION - PROGRESSION
CLINICAL_PROGRESSION: GRADUALLY WORSENING
CLINICAL_PROGRESSION: GRADUALLY IMPROVING
CLINICAL_PROGRESSION: GRADUALLY WORSENING
CLINICAL_PROGRESSION: NOT CHANGED
CLINICAL_PROGRESSION: GRADUALLY WORSENING
CLINICAL_PROGRESSION: GRADUALLY IMPROVING

## 2019-07-18 ASSESSMENT — PAIN DESCRIPTION - ORIENTATION
ORIENTATION: MID
ORIENTATION: MID;LOWER

## 2019-07-18 ASSESSMENT — LIFESTYLE VARIABLES: SMOKING_STATUS: 1

## 2019-07-18 ASSESSMENT — PAIN - FUNCTIONAL ASSESSMENT
PAIN_FUNCTIONAL_ASSESSMENT: PREVENTS OR INTERFERES SOME ACTIVE ACTIVITIES AND ADLS
PAIN_FUNCTIONAL_ASSESSMENT: PREVENTS OR INTERFERES WITH MANY ACTIVE NOT PASSIVE ACTIVITIES
PAIN_FUNCTIONAL_ASSESSMENT: 0-10
PAIN_FUNCTIONAL_ASSESSMENT: PREVENTS OR INTERFERES SOME ACTIVE ACTIVITIES AND ADLS

## 2019-07-18 ASSESSMENT — PAIN DESCRIPTION - ONSET
ONSET: GRADUAL
ONSET: ON-GOING
ONSET: GRADUAL
ONSET: GRADUAL

## 2019-07-18 ASSESSMENT — PAIN DESCRIPTION - PAIN TYPE
TYPE: SURGICAL PAIN

## 2019-07-18 ASSESSMENT — PAIN DESCRIPTION - FREQUENCY
FREQUENCY: CONTINUOUS

## 2019-07-18 NOTE — ANESTHESIA PRE PROCEDURE
07/15/2019    K 3.8 07/15/2019     07/15/2019    CO2 21 07/15/2019    BUN 6 07/15/2019    CREATININE 0.8 07/15/2019    GFRAA >60 07/15/2019    LABGLOM >60 07/15/2019    GLUCOSE 110 07/15/2019    GLUCOSE 92 10/31/2010    PROT 7.3 07/15/2019    CALCIUM 10.1 07/15/2019    BILITOT <0.2 07/15/2019    ALKPHOS 66 07/15/2019    AST 18 07/15/2019    ALT 16 07/15/2019       POC Tests: No results for input(s): POCGLU, POCNA, POCK, POCCL, POCBUN, POCHEMO, POCHCT in the last 72 hours. Coags:   Lab Results   Component Value Date    PROTIME 11.4 07/05/2019    INR 1.0 07/05/2019    APTT 37.1 07/05/2019       HCG (If Applicable):   Lab Results   Component Value Date    PREGTESTUR NEGATIVE 07/18/2019        ABGs: No results found for: PHART, PO2ART, TOH1KAF, DPE4NOY, BEART, F9WZGBKH     Type & Screen (If Applicable):  No results found for: LABABO, LABRH    EKG 7/18/19  Narrative     Sinus bradycardia  Otherwise normal ECG  When compared with ECG of 17-NOV-2016 08:58,  No significant change was found     CXR 7/5/19  Impression   No acute cardiopulmonary disease.           Anesthesia Evaluation  Patient summary reviewed and Nursing notes reviewed no history of anesthetic complications:   Airway: Mallampati: III  TM distance: >3 FB   Neck ROM: full  Mouth opening: > = 3 FB Dental:      Comment: Pt has very poor dentition, was reluctant to open her mouth for exam, denies chipped or loose teeth     Pulmonary: breath sounds clear to auscultation  (+) current smoker          Patient did not smoke on day of surgery. ROS comment: Ch Marijuana abuse, she claimed that she did not use it today 7/18/19. Cardiovascular:    (+) hypertension:,       ECG reviewed  Rhythm: regular  Rate: normal           Beta Blocker:  Not on Beta Blocker         Neuro/Psych:   Negative Neuro/Psych ROS  (+) depression/anxiety             GI/Hepatic/Renal: Neg GI/Hepatic/Renal ROS           ROS comment: Hx of pancreatitis .    Endo/Other:

## 2019-07-18 NOTE — OP NOTE
PREOPERATIVE DIAGNOSES: Enlarged uterus, menometrorrhagia, fibroid uterus     POSTOPERATIVE DIAGNOSES:uterine fibroids      PROCEDURES:       . Total abdominal hysterectomy, bilateral salpingectomy.     ESTIMATED BLOOD LOSS: Approximately 100 mL.      URINE OUTPUT: 850 mL,       INTRAVENOUS FLUIDS: 2 L.     COMPLICATIONS: None.     PROCEDURE IN BRIEF: Patient under general anesthesia in supine position the patient is given a supine  position thereafter and parts were painted and draped as usual. Vertical  midline incision made infraumbilically. Abdomen is opened in layers in a  standard manner. The fascia layer is very thick and scarred. Knife was used  to incise through the whole layer and then the peritoneum is opened bluntly  and incision is extended cephalad as well as caudally without any problems. While extending the incision caudally, the uterus is enlarged approximately 16 weeks size with multiple uterine fibroids  At this point, 2 straight Darnell clamps were placed at the cornual ends on either side first the round ligament on the left side is ongoing so blatantly fighting with each other man is  clamped, cut medial to the clamp, ligated with the help of 0 Vicryl in the  form of Darnell stitch. Similarly on the left side, round ligament could not  be seen very distinctly, and once again the fold over there is clamped, cut  medial to the clamp, ligated with the help of 0 Vicryl in the form of Darnell  stitch. There is vesicouterine fold is incised from right round ligament down to bladder and towards the left round ligament. Bladder is pushed away from the underlying uterus endocervix and a blunt and sharp dissection.   Right ovary is  from the tube patient had a tubal sterilization in the past so the distal segment of the right tube is clamped at the mesosalpinx and that is cut and medial to the clamp and ligated with the help of 0 Vicryl in the form of any stitch right segment of the tube is removed handed over to the nurse similarly left tube is also  and handed over to the nurse after removing. Both the ovaries appear unremarkable. Plan is to leave the ovaries behind. At this point another clamp is placed at the coronal and on the left side and cut medial to the clamp ligated with the help of 0 Vicryl in the form of any stitch similarly it is done on the other side and the Darnell at the cornual ends are put on stretch.     the fundus and the uterus is put on stretch., and now the  uterines on the right side is skeletonized and clamped in 2-3 pedicles  separately and cut medial to the clamp and ligated with the help of 0 Vicryl  in the form of Darnell stitch individually. Similarly, it is done on the other  side. Then the cardinal ligament on the right side is clamped, cut medial to  the clamp, ligated with the help of 0 Vicryl in the form of Darnell stitch. Similarly, it is done on the other side. Uterosacral ligament on the right  side is clamped, cut medial to the clamp, ligated with the help of 0 Vicryl in  the form of Darnell stitch. Similarly, it is done on the other side.      Cervix is reasonably long and uterus is excised at the level of supracervix  and handed over to the nurse. Two tenaculums are placed on the cervix. Cervix is put on stretch. Kochers are placed on anterior aspect of the top of  the vagina. An incision is and the vagina is opened. Allis clamp is placed  and incision is carried around in a circumferential manner. Cervix is   and handed over to the nurse. Allis clamps are placed on the edges  of the vagina. Angles of the vagina are sutured individually with the help of  0 Vicryl in the form of figure-of-eight stitch on the right side as well as on  the left side. Anterior edge of the vagina is sutured continuous with locking  stitches.  Posteriorly, it is also sutured with the help of 0 Vicryl  continuous locking sutures and then anterior and posterior vaginal walls are  placed together by single figure-of-eight stitch. Hemostasis is observed  completely. All the pedicles are hemostatic at present, and then hemostatic  agent SNoW is placed on the vaginal vault and on the left side as well as on  the right side where the other pedicles are. Tapes are removed. O'Maico-O'Rivas retractor is also removed. Tape counts are correct. Needle and instrument counts are correct. At this point, bowels are brought  down into the field and abdomen is closed in layers. Peritoneum is placed  together with the help of 2-0 Vicryl continuous nonlocking stitches and then  the fascia is placed together with help of 0 PDS continuous nonlocking  stitches with the help of 2 pieces of suture material and subcutaneous fat is  placed together with help of interrupted stitches and skin is stapled together  and then aquasol dressing placed  point, the patient is coming out of anesthesia. The patient tolerated  procedure very well. The patient is transferred to recovery room with stable  vital signs in stable condition.

## 2019-07-18 NOTE — ANESTHESIA POSTPROCEDURE EVALUATION
Department of Anesthesiology  Postprocedure Note    Patient: Kiersten Wilkerson  MRN: [id number]  YOB: 1975  Date of evaluation: 7/18/2019  Time:  1:00 PM     Procedure Summary     Date:  07/18/19 Room / Location:  [facility] 05 / [id number] [address] [address]    Anesthesia Start:  0732 Anesthesia Stop:  6265    Procedure:  TOTAL ABDOMINAL HYSTERECTOMY WITH BILATERAL SALPINGCTOMY (N/A ) Diagnosis:  (ANEMIA MENOMETRORRHAGIA)    Surgeon:  Willie Dale MD Responsible Provider:  Barbra Cruz MD    Anesthesia Type:  general ASA Status:  2          Anesthesia Type: general    Hilario Phase I: Hilario Score: 10    Hilario Phase II:      Last vitals: Reviewed and per EMR flowsheets.        Anesthesia Post Evaluation    Patient location during evaluation: PACU  Patient participation: complete - patient participated  Level of consciousness: awake  Pain score: 0  Airway patency: patent  Nausea & Vomiting: no nausea  Complications: no  Cardiovascular status: blood pressure returned to baseline  Respiratory status: acceptable  Hydration status: euvolemic

## 2019-07-18 NOTE — PROGRESS NOTES
Restless medicated frequently for c/o pain with some relief Emotional support given Dr. Jeremiah Garcia at bedside small amount sang. Drainage noted from upper right and left side dressing Reinforced with no further drainage noted Small amount sang drainge noted vaginally.  Report called to RN 3rd floor

## 2019-07-19 LAB
HCT VFR BLD CALC: 38 % (ref 34–48)
HEMOGLOBIN: 12.2 G/DL (ref 11.5–15.5)

## 2019-07-19 PROCEDURE — 6360000002 HC RX W HCPCS: Performed by: OBSTETRICS & GYNECOLOGY

## 2019-07-19 PROCEDURE — 1200000000 HC SEMI PRIVATE

## 2019-07-19 PROCEDURE — 85014 HEMATOCRIT: CPT

## 2019-07-19 PROCEDURE — 6370000000 HC RX 637 (ALT 250 FOR IP): Performed by: OBSTETRICS & GYNECOLOGY

## 2019-07-19 PROCEDURE — 36415 COLL VENOUS BLD VENIPUNCTURE: CPT

## 2019-07-19 PROCEDURE — 85018 HEMOGLOBIN: CPT

## 2019-07-19 PROCEDURE — 2580000003 HC RX 258: Performed by: OBSTETRICS & GYNECOLOGY

## 2019-07-19 RX ORDER — HYDROMORPHONE HYDROCHLORIDE 4 MG/1
4 TABLET ORAL EVERY 4 HOURS PRN
Status: DISCONTINUED | OUTPATIENT
Start: 2019-07-19 | End: 2019-07-20 | Stop reason: HOSPADM

## 2019-07-19 RX ORDER — BISACODYL 10 MG
10 SUPPOSITORY, RECTAL RECTAL ONCE
Status: COMPLETED | OUTPATIENT
Start: 2019-07-19 | End: 2019-07-19

## 2019-07-19 RX ORDER — HYDROMORPHONE HYDROCHLORIDE 2 MG/1
2 TABLET ORAL EVERY 4 HOURS PRN
Status: DISCONTINUED | OUTPATIENT
Start: 2019-07-19 | End: 2019-07-20 | Stop reason: HOSPADM

## 2019-07-19 RX ORDER — ZOLPIDEM TARTRATE 5 MG/1
5 TABLET ORAL NIGHTLY PRN
Status: DISCONTINUED | OUTPATIENT
Start: 2019-07-19 | End: 2019-07-20 | Stop reason: HOSPADM

## 2019-07-19 RX ADMIN — HYDROMORPHONE HYDROCHLORIDE 4 MG: 4 TABLET ORAL at 10:00

## 2019-07-19 RX ADMIN — Medication 10 ML: at 20:09

## 2019-07-19 RX ADMIN — HYDROMORPHONE HYDROCHLORIDE 4 MG: 4 TABLET ORAL at 14:14

## 2019-07-19 RX ADMIN — ZOLPIDEM TARTRATE 5 MG: 5 TABLET ORAL at 20:09

## 2019-07-19 RX ADMIN — HYDROMORPHONE HYDROCHLORIDE 4 MG: 4 TABLET ORAL at 22:29

## 2019-07-19 RX ADMIN — BISACODYL 10 MG: 10 SUPPOSITORY RECTAL at 20:09

## 2019-07-19 RX ADMIN — Medication 6 MG: at 04:16

## 2019-07-19 RX ADMIN — HYDROMORPHONE HYDROCHLORIDE 4 MG: 4 TABLET ORAL at 18:25

## 2019-07-19 ASSESSMENT — PAIN SCALES - GENERAL
PAINLEVEL_OUTOF10: 8
PAINLEVEL_OUTOF10: 3
PAINLEVEL_OUTOF10: 8
PAINLEVEL_OUTOF10: 9
PAINLEVEL_OUTOF10: 3
PAINLEVEL_OUTOF10: 9

## 2019-07-20 VITALS
SYSTOLIC BLOOD PRESSURE: 147 MMHG | WEIGHT: 168 LBS | TEMPERATURE: 98.4 F | DIASTOLIC BLOOD PRESSURE: 72 MMHG | HEIGHT: 70 IN | HEART RATE: 65 BPM | BODY MASS INDEX: 24.05 KG/M2 | RESPIRATION RATE: 16 BRPM | OXYGEN SATURATION: 98 %

## 2019-07-20 LAB
EKG ATRIAL RATE: 50 BPM
EKG P AXIS: 54 DEGREES
EKG P-R INTERVAL: 198 MS
EKG Q-T INTERVAL: 428 MS
EKG QRS DURATION: 82 MS
EKG QTC CALCULATION (BAZETT): 390 MS
EKG R AXIS: 4 DEGREES
EKG T AXIS: 34 DEGREES
EKG VENTRICULAR RATE: 50 BPM

## 2019-07-20 PROCEDURE — 2580000003 HC RX 258: Performed by: OBSTETRICS & GYNECOLOGY

## 2019-07-20 PROCEDURE — 6370000000 HC RX 637 (ALT 250 FOR IP): Performed by: OBSTETRICS & GYNECOLOGY

## 2019-07-20 RX ORDER — HYDROCODONE BITARTRATE AND ACETAMINOPHEN 5; 325 MG/1; MG/1
1 TABLET ORAL EVERY 8 HOURS PRN
Qty: 15 TABLET | Refills: 0 | Status: SHIPPED | OUTPATIENT
Start: 2019-07-20 | End: 2019-07-25

## 2019-07-20 RX ORDER — IBUPROFEN 800 MG/1
800 TABLET ORAL EVERY 6 HOURS PRN
Qty: 15 TABLET | Refills: 1 | Status: SHIPPED | OUTPATIENT
Start: 2019-07-20 | End: 2019-11-10

## 2019-07-20 RX ADMIN — HYDROMORPHONE HYDROCHLORIDE 4 MG: 4 TABLET ORAL at 11:29

## 2019-07-20 RX ADMIN — HYDROMORPHONE HYDROCHLORIDE 4 MG: 4 TABLET ORAL at 07:30

## 2019-07-20 RX ADMIN — SODIUM CHLORIDE, POTASSIUM CHLORIDE, SODIUM LACTATE AND CALCIUM CHLORIDE: 600; 310; 30; 20 INJECTION, SOLUTION INTRAVENOUS at 05:49

## 2019-07-20 RX ADMIN — HYDROMORPHONE HYDROCHLORIDE 4 MG: 4 TABLET ORAL at 02:37

## 2019-07-20 ASSESSMENT — PAIN SCALES - GENERAL
PAINLEVEL_OUTOF10: 9
PAINLEVEL_OUTOF10: 0
PAINLEVEL_OUTOF10: 7
PAINLEVEL_OUTOF10: 9

## 2019-07-20 NOTE — PROGRESS NOTES
POD # 2     S:Pt feels better,She voided,passed flatus    O:       Blood pressure (!) 147/72, pulse 65, temperature 98.4 °F (36.9 °C), temperature source Oral, resp. rate 16, height 5' 10\" (1.778 m), weight 168 lb (76.2 kg), last menstrual period 06/01/2019, SpO2 98 %, not currently breastfeeding. In: 480 [P.O.:480]  Out: 500 [Urine:500]   CONSTITUTIONAL:  awake, alert, cooperative, no apparent distress, and appears stated age   BACK:  Symmetric, no curvature, spinous processes are non-tender on palpation, paraspinous muscles are non-tender on palpation, no costal vertebral tenderness   LUNGS:  No increased work of breathing, good air exchange, clear to auscultation bilaterally, no crackles or wheezing   CARDIOVASCULAR:  Normal apical impulse, regular rate and rhythm, normal S1 and S2, no S3 or S4, and no murmur noted   ABDOMEN:  , normal bowel sounds, soft, non-distended, non-tender, no masses palpated, no hepatosplenomegally incision is healing well   GENITAL/URINARY:  Small amount bleeding at perineum, catheter draining clear urine   MUSCULOSKELETAL: No  Edema,swelling,erythema, no tenderness of calf muscles on either side. Hemoglobin/Hematocrit:stable    A: POD # 2 S/P total abdominal hysterectomy with bilateral salpingectomy  Active Problems:    Status post abdominal hysterectomy  Resolved Problems:    * No resolved hospital problems. *    P:  Advance diet as tolerated  Activity as tolerated  Pain meds as required. D/C to home with insts  norco po q 6-8 hrs for pain. Pt is asked not to take suboxone at the same time. RTo in 1 wk on wed.          Beverly Hernandes M.D.  7/20/2019  1:38 PM        Eddye Montero  1975  [id number]

## 2019-07-20 NOTE — PLAN OF CARE
Problem: Pain:  Goal: Pain level will decrease  Description  Pain level will decrease  7/20/2019 0018 by Jah Valdez RN  Outcome: Met This Shift     Problem: Pain:  Goal: Control of acute pain  Description  Control of acute pain  7/20/2019 0018 by Jah Valdez RN  Outcome: Met This Shift     Problem: Falls - Risk of:  Goal: Will remain free from falls  Description  Will remain free from falls  7/20/2019 0018 by Jah Valdez RN  Outcome: Met This Shift     Problem: Falls - Risk of:  Goal: Absence of physical injury  Description  Absence of physical injury  Outcome: Met This Shift     Problem: Infection - Surgical Site:  Goal: Demonstration of wound healing without infection will improve  Description  Demonstration of wound healing without infection will improve  7/20/2019 0018 by Jah Valdez RN  Outcome: Met This Shift     Problem: Urinary Elimination - Impaired:  Goal: Urinary elimination within specified parameters  Description  Urinary elimination within specified parameters  7/20/2019 0018 by Jah Valdez RN  Outcome: Met This Shift     Problem: Venous Thromboembolism:  Goal: Will show no signs or symptoms of venous thromboembolism  Description  Will show no signs or symptoms of venous thromboembolism  7/20/2019 0018 by Jah Valdez RN  Outcome: Met This Shift

## 2019-07-23 LAB — CANNABINOIDS CONF, URINE: 375 NG/ML

## 2019-11-10 ENCOUNTER — HOSPITAL ENCOUNTER (EMERGENCY)
Age: 44
Discharge: HOME | End: 2019-11-10
Payer: MEDICAID

## 2019-11-10 VITALS
WEIGHT: 173.38 LBS | OXYGEN SATURATION: 98 % | BODY MASS INDEX: 25.68 KG/M2 | RESPIRATION RATE: 17 BRPM | HEART RATE: 76 BPM | DIASTOLIC BLOOD PRESSURE: 110 MMHG | TEMPERATURE: 99 F | SYSTOLIC BLOOD PRESSURE: 194 MMHG | HEIGHT: 69 IN

## 2019-11-10 DIAGNOSIS — K08.89 PAIN, DENTAL: Primary | ICD-10-CM

## 2019-11-10 PROCEDURE — 99282 EMERGENCY DEPT VISIT SF MDM: CPT

## 2019-11-10 RX ORDER — IBUPROFEN 800 MG/1
800 TABLET ORAL EVERY 8 HOURS PRN
Qty: 21 TABLET | Refills: 0 | Status: SHIPPED | OUTPATIENT
Start: 2019-11-10 | End: 2020-06-10

## 2019-11-10 RX ORDER — HYDROCODONE BITARTRATE AND ACETAMINOPHEN 5; 325 MG/1; MG/1
1 TABLET ORAL EVERY 6 HOURS PRN
Qty: 8 TABLET | Refills: 0 | Status: SHIPPED | OUTPATIENT
Start: 2019-11-10 | End: 2019-11-12

## 2019-11-10 RX ORDER — AMOXICILLIN 500 MG/1
500 CAPSULE ORAL 3 TIMES DAILY
Qty: 30 CAPSULE | Refills: 0 | Status: SHIPPED | OUTPATIENT
Start: 2019-11-10 | End: 2019-11-20

## 2019-11-10 ASSESSMENT — PAIN SCALES - GENERAL: PAINLEVEL_OUTOF10: 8

## 2020-06-10 ENCOUNTER — HOSPITAL ENCOUNTER (EMERGENCY)
Age: 45
Discharge: HOME | End: 2020-06-10
Attending: EMERGENCY MEDICINE
Payer: MEDICAID

## 2020-06-10 VITALS
TEMPERATURE: 98.5 F | OXYGEN SATURATION: 98 % | DIASTOLIC BLOOD PRESSURE: 93 MMHG | HEART RATE: 66 BPM | SYSTOLIC BLOOD PRESSURE: 152 MMHG | RESPIRATION RATE: 18 BRPM

## 2020-06-10 PROCEDURE — 96372 THER/PROPH/DIAG INJ SC/IM: CPT

## 2020-06-10 PROCEDURE — 6360000002 HC RX W HCPCS: Performed by: EMERGENCY MEDICINE

## 2020-06-10 PROCEDURE — 99282 EMERGENCY DEPT VISIT SF MDM: CPT

## 2020-06-10 RX ORDER — PENICILLIN V POTASSIUM 500 MG/1
500 TABLET ORAL 4 TIMES DAILY
Qty: 40 TABLET | Refills: 0 | Status: SHIPPED | OUTPATIENT
Start: 2020-06-10 | End: 2020-06-20

## 2020-06-10 RX ORDER — KETOROLAC TROMETHAMINE 30 MG/ML
30 INJECTION, SOLUTION INTRAMUSCULAR; INTRAVENOUS ONCE
Status: COMPLETED | OUTPATIENT
Start: 2020-06-10 | End: 2020-06-10

## 2020-06-10 RX ORDER — IBUPROFEN 800 MG/1
800 TABLET ORAL EVERY 6 HOURS PRN
Qty: 28 TABLET | Refills: 0 | Status: SHIPPED | OUTPATIENT
Start: 2020-06-10 | End: 2021-03-22

## 2020-06-10 RX ADMIN — KETOROLAC TROMETHAMINE 30 MG: 30 INJECTION, SOLUTION INTRAMUSCULAR at 11:13

## 2020-06-10 ASSESSMENT — ENCOUNTER SYMPTOMS
SHORTNESS OF BREATH: 0
CHEST TIGHTNESS: 0
FACIAL SWELLING: 0
TROUBLE SWALLOWING: 0

## 2020-06-10 ASSESSMENT — PAIN SCALES - GENERAL
PAINLEVEL_OUTOF10: 10
PAINLEVEL_OUTOF10: 10

## 2020-06-10 ASSESSMENT — PAIN DESCRIPTION - LOCATION: LOCATION: TEETH

## 2020-06-10 ASSESSMENT — PAIN DESCRIPTION - ORIENTATION: ORIENTATION: RIGHT

## 2020-06-10 NOTE — ED PROVIDER NOTES
Morphine    -------------------------------------------------- RESULTS -------------------------------------------------  Labs:  No results found for this visit on 06/10/20. Radiology:  No orders to display       ------------------------- NURSING NOTES AND VITALS REVIEWED ---------------------------  Date / Time Roomed:  6/10/2020 10:59 AM  ED Bed Assignment:  23/23    The nursing notes within the ED encounter and vital signs as below have been reviewed. BP (!) 152/93   Pulse 66   Temp 98.5 °F (36.9 °C)   Resp 18   LMP 06/01/2019   SpO2 98%   Oxygen Saturation Interpretation: Normal      ------------------------------------------ PROGRESS NOTES ------------------------------------------  I have spoken with the patient and discussed todays results, in addition to providing specific details for the plan of care and counseling regarding the diagnosis and prognosis. Their questions are answered at this time and they are agreeable with the plan. I discussed at length with them reasons for immediate return here for re evaluation. They will followup with primary care by calling their office tomorrow. --------------------------------- ADDITIONAL PROVIDER NOTES ---------------------------------  At this time the patient is without objective evidence of an acute process requiring hospitalization or inpatient management. They have remained hemodynamically stable throughout their entire ED visit and are stable for discharge with outpatient follow-up. The plan has been discussed in detail and they are aware of the specific conditions for emergent return, as well as the importance of follow-up. New Prescriptions    IBUPROFEN (ADVIL;MOTRIN) 800 MG TABLET    Take 1 tablet by mouth every 6 hours as needed for Pain    PENICILLIN V POTASSIUM (VEETID) 500 MG TABLET    Take 1 tablet by mouth 4 times daily for 10 days       Diagnosis:  1. Pain, dental    2.  Dental caries        Disposition:  Patient's disposition: Discharge to home  Patient's condition is stable.              Amara Julian DO  06/10/20 1111

## 2021-03-22 ENCOUNTER — HOSPITAL ENCOUNTER (EMERGENCY)
Age: 46
Discharge: HOME | End: 2021-03-22
Attending: EMERGENCY MEDICINE
Payer: MEDICAID

## 2021-03-22 VITALS
RESPIRATION RATE: 16 BRPM | HEART RATE: 68 BPM | WEIGHT: 186 LBS | BODY MASS INDEX: 26.63 KG/M2 | TEMPERATURE: 96.9 F | OXYGEN SATURATION: 98 % | SYSTOLIC BLOOD PRESSURE: 132 MMHG | HEIGHT: 70 IN | DIASTOLIC BLOOD PRESSURE: 82 MMHG

## 2021-03-22 DIAGNOSIS — K04.7 DENTAL ABSCESS: Primary | ICD-10-CM

## 2021-03-22 PROCEDURE — 96372 THER/PROPH/DIAG INJ SC/IM: CPT

## 2021-03-22 PROCEDURE — G0381 LEV 2 HOSP TYPE B ED VISIT: HCPCS

## 2021-03-22 PROCEDURE — 6360000002 HC RX W HCPCS: Performed by: EMERGENCY MEDICINE

## 2021-03-22 PROCEDURE — 6370000000 HC RX 637 (ALT 250 FOR IP): Performed by: EMERGENCY MEDICINE

## 2021-03-22 RX ORDER — LEVOTHYROXINE SODIUM 0.12 MG/1
125 TABLET ORAL DAILY
COMMUNITY

## 2021-03-22 RX ORDER — MONTELUKAST SODIUM 10 MG/1
10 TABLET ORAL NIGHTLY
COMMUNITY

## 2021-03-22 RX ORDER — CEPHALEXIN 500 MG/1
500 CAPSULE ORAL 4 TIMES DAILY
Qty: 40 CAPSULE | Refills: 0 | Status: SHIPPED | OUTPATIENT
Start: 2021-03-22 | End: 2021-04-01

## 2021-03-22 RX ORDER — CEPHALEXIN 500 MG/1
500 CAPSULE ORAL ONCE
Status: COMPLETED | OUTPATIENT
Start: 2021-03-22 | End: 2021-03-22

## 2021-03-22 RX ORDER — FLUTICASONE FUROATE 27.5 UG/1
2 SPRAY, METERED NASAL DAILY
COMMUNITY

## 2021-03-22 RX ORDER — KETOROLAC TROMETHAMINE 30 MG/ML
30 INJECTION, SOLUTION INTRAMUSCULAR; INTRAVENOUS ONCE
Status: COMPLETED | OUTPATIENT
Start: 2021-03-22 | End: 2021-03-22

## 2021-03-22 RX ORDER — IBUPROFEN 800 MG/1
800 TABLET ORAL EVERY 8 HOURS PRN
Qty: 21 TABLET | Refills: 0 | Status: SHIPPED | OUTPATIENT
Start: 2021-03-22 | End: 2021-03-29

## 2021-03-22 RX ADMIN — CEPHALEXIN 500 MG: 500 CAPSULE ORAL at 17:24

## 2021-03-22 RX ADMIN — KETOROLAC TROMETHAMINE 30 MG: 30 INJECTION, SOLUTION INTRAMUSCULAR; INTRAVENOUS at 17:24

## 2021-03-22 ASSESSMENT — ENCOUNTER SYMPTOMS
FACIAL SWELLING: 1
BACK PAIN: 0
EYE REDNESS: 0
VOMITING: 0
WHEEZING: 0
ABDOMINAL DISTENTION: 0
EYE PAIN: 0
SINUS PRESSURE: 0
EYE DISCHARGE: 0
DIARRHEA: 0
SORE THROAT: 0
SHORTNESS OF BREATH: 0
NAUSEA: 0
COUGH: 0

## 2021-03-22 ASSESSMENT — PAIN SCALES - GENERAL
PAINLEVEL_OUTOF10: 7
PAINLEVEL_OUTOF10: 7

## 2021-03-22 ASSESSMENT — PAIN DESCRIPTION - DESCRIPTORS: DESCRIPTORS: CONSTANT;THROBBING;ACHING

## 2021-03-22 ASSESSMENT — PAIN DESCRIPTION - LOCATION: LOCATION: FACE

## 2021-03-22 ASSESSMENT — PAIN DESCRIPTION - PROGRESSION: CLINICAL_PROGRESSION: GRADUALLY WORSENING

## 2021-03-22 ASSESSMENT — PAIN DESCRIPTION - ORIENTATION: ORIENTATION: LEFT

## 2021-03-22 ASSESSMENT — PAIN DESCRIPTION - ONSET: ONSET: ON-GOING

## 2021-03-22 NOTE — LETTER
[address] [place] [address] Emergency Department  [place] [address]  Phone: [phone]             March 22, 2021    Patient: Leo Leavitt   YOB: 1975   Date of Visit: 3/22/2021       To Whom It May Concern:    Solomon Weaver was seen and treated in our emergency department on 3/22/2021. She may return to work on 3/23/21.       Sincerely,             Signature:__________________________________

## 2021-03-22 NOTE — ED PROVIDER NOTES
The history is provided by the patient. Dental Pain  Location:  Upper  Upper teeth location:  13/PIERCE 2nd bicuspid, 14/PIERCE 1st molar and 15/PIERCE 2nd molar  Quality:  Aching and burning  Severity:  Moderate  Onset quality:  Gradual  Timing:  Constant  Progression:  Worsening  Chronicity:  Recurrent  Context: abscess and poor dentition    Previous work-up:  Dental exam  Relieved by:  Nothing  Worsened by: Hot food/drink, touching, jaw movement and pressure  Ineffective treatments:  None tried  Associated symptoms: facial pain and facial swelling    Associated symptoms: no fever and no headaches    Risk factors: lack of dental care, periodontal disease and smoking         Review of Systems   Constitutional: Negative for chills and fever. HENT: Positive for dental problem and facial swelling. Negative for ear pain, sinus pressure and sore throat. Eyes: Negative for pain, discharge and redness. Respiratory: Negative for cough, shortness of breath and wheezing. Cardiovascular: Negative for chest pain. Gastrointestinal: Negative for abdominal distention, diarrhea, nausea and vomiting. Genitourinary: Negative for dysuria and frequency. Musculoskeletal: Negative for arthralgias and back pain. Skin: Negative for rash and wound. Neurological: Negative for weakness and headaches. Hematological: Negative for adenopathy. Psychiatric/Behavioral: Negative. All other systems reviewed and are negative. Physical Exam  Vitals signs and nursing note reviewed. Constitutional:       Appearance: She is well-developed. HENT:      Head: Normocephalic and atraumatic. Nose:      Left Sinus: Maxillary sinus tenderness present. Mouth/Throat:      Dentition: Abnormal dentition. Dental tenderness, gingival swelling and dental abscesses present. Eyes:      Pupils: Pupils are equal, round, and reactive to light. Neck:      Musculoskeletal: Normal range of motion and neck supple.    Cardiovascular: Rate and Rhythm: Normal rate and regular rhythm. Heart sounds: Normal heart sounds. No murmur. Pulmonary:      Effort: Pulmonary effort is normal.      Breath sounds: Normal breath sounds. Abdominal:      General: Bowel sounds are normal.      Palpations: Abdomen is soft. Tenderness: There is no abdominal tenderness. There is no guarding or rebound. Skin:     General: Skin is warm and dry. Neurological:      Mental Status: She is alert and oriented to person, place, and time. Psychiatric:         Behavior: Behavior normal.         Thought Content: Thought content normal.         Judgment: Judgment normal.        --------------------------------------------- PAST HISTORY ---------------------------------------------  Past Medical History:  has a past medical history of Anemia, Anxiety, Chronic lower back pain, H/O exercise stress test, History of blood transfusion, Hypertension, and Pancreatitis. Past Surgical History:  has a past surgical history that includes Tubal ligation (2010); transthoracic echocardiogram (12/09/2015); other surgical history (11/17/2016); Hysterectomy; Hysterectomy, total abdominal (N/A, 7/18/2019); and Thyroidectomy. Social History:  reports that she has been smoking cigarettes. She has been smoking about 0.20 packs per day. She has never used smokeless tobacco. She reports previous alcohol use of about 1.0 standard drinks of alcohol per week. She reports previous drug use. Drug: Marijuana. Family History: family history includes Cancer in her father; Diabetes in her mother; High Blood Pressure in her mother. The patients home medications have been reviewed. Allergies: Morphine    -------------------------------------------------- RESULTS -------------------------------------------------  No results found for this visit on 03/22/21.   No orders to display       ------------------------- NURSING NOTES AND VITALS REVIEWED ---------------------------   The nursing notes within the ED encounter and vital signs as below have been reviewed. /82   Pulse 68   Temp 96.9 °F (36.1 °C) (Temporal)   Resp 16   Ht 5' 10\" (1.778 m)   Wt 186 lb (84.4 kg)   LMP 06/01/2019   SpO2 98%   BMI 26.69 kg/m²   Oxygen Saturation Interpretation: Normal      ------------------------------------------ PROGRESS NOTES ------------------------------------------   I have spoken with the patient and discussed todays results, in addition to providing specific details for the plan of care and counseling regarding the diagnosis and prognosis. Their questions are answered at this time and they are agreeable with the plan.      --------------------------------- ADDITIONAL PROVIDER NOTES ---------------------------------        This patient is stable for discharge. I have shared the specific conditions for return, as well as the importance of follow-up. IMPRESSION:     1.  Dental abscess      Patient's Medications   New Prescriptions    CEPHALEXIN (KEFLEX) 500 MG CAPSULE    Take 1 capsule by mouth 4 times daily for 10 days    IBUPROFEN (IBU) 800 MG TABLET    Take 1 tablet by mouth every 8 hours as needed for Pain   Previous Medications    FERROUS GLUCONATE 324 (37.5 FE) MG TABS    Take 1 tablet by mouth 2 times daily    FLUTICASONE (FLONASE SENSIMIST) 27.5 MCG/SPRAY NASAL SPRAY    2 sprays by Each Nostril route daily    LEVOTHYROXINE (SYNTHROID) 125 MCG TABLET    Take 125 mcg by mouth Daily    MONTELUKAST (SINGULAIR) 10 MG TABLET    Take 10 mg by mouth nightly    UNKNOWN TO PATIENT    Indications: Pt on 2 BP pills    Modified Medications    No medications on file   Discontinued Medications    IBUPROFEN (ADVIL;MOTRIN) 800 MG TABLET    Take 1 tablet by mouth every 6 hours as needed for Pain         Procedures     AAMIR Garcias,   03/22/21 0214

## 2021-07-20 ENCOUNTER — HOSPITAL ENCOUNTER (EMERGENCY)
Age: 46
Discharge: HOME | End: 2021-07-20
Payer: MEDICAID

## 2021-07-20 VITALS
HEART RATE: 92 BPM | SYSTOLIC BLOOD PRESSURE: 138 MMHG | TEMPERATURE: 98.5 F | OXYGEN SATURATION: 97 % | DIASTOLIC BLOOD PRESSURE: 74 MMHG | RESPIRATION RATE: 16 BRPM

## 2021-07-20 DIAGNOSIS — K02.9 PAIN DUE TO DENTAL CARIES: Primary | ICD-10-CM

## 2021-07-20 PROCEDURE — 99282 EMERGENCY DEPT VISIT SF MDM: CPT

## 2021-07-20 RX ORDER — TRAMADOL HYDROCHLORIDE 50 MG/1
50 TABLET ORAL EVERY 6 HOURS PRN
Qty: 10 TABLET | Refills: 0 | Status: SHIPPED | OUTPATIENT
Start: 2021-07-20 | End: 2021-07-25

## 2021-07-20 RX ORDER — CLINDAMYCIN HYDROCHLORIDE 300 MG/1
300 CAPSULE ORAL 3 TIMES DAILY
Qty: 30 CAPSULE | Refills: 0 | Status: SHIPPED | OUTPATIENT
Start: 2021-07-20 | End: 2021-07-30

## 2021-07-20 ASSESSMENT — PAIN SCALES - GENERAL: PAINLEVEL_OUTOF10: 10

## 2021-07-20 NOTE — ED PROVIDER NOTES
[facility]    HPI: Yeyo Tolbert 39 y.o. female with a past medical history of   Past Medical History:   Diagnosis Date    Anemia     has had transfusions, iron infusions 3/2015    Anxiety     Chronic lower back pain     H/O exercise stress test 2012    not sure of date, at [facility], neg    History of blood transfusion     at [facility]    Hypertension     Pancreatitis 2015    treated by Dr Hunter Martinez, [facility]    presents with a complaint of dental pain. The patient states this pain has been gradual in onset, persistent, moderate in severity and worse today which is what prompted the visit. Pain has not been relieved with any OTC medications. Patient denies any unilateral facial swelling. Patient is able to handle their own secretions and drink fluids without difficulty. Patient denies any fever. The patient also denies any history of dental trauma. Denies difficulty breathing or swallowing. The location of the pain and appears to be isolated over  all of the teeth that are present. States she just left her dentist office to make arrangements to have all of her teeth extracted and he would not give her any pain medication. States that she completed a course of clindamycin recently but is having worsening pain. States she has an upcoming appointment with an oral surgeon to have all of her teeth extracted. She denies any fever.     ROS:   Pertinent positives and negatives are stated within HPI, all other systems reviewed and are negative.  --------------------------------------------- PAST HISTORY ---------------------------------------------  Past Medical History:  has a past medical history of Anemia, Anxiety, Chronic lower back pain, H/O exercise stress test, History of blood transfusion, Hypertension, and Pancreatitis. Past Surgical History:  has a past surgical history that includes Tubal ligation (2010); transthoracic echocardiogram (12/09/2015); other surgical history (11/17/2016);  Hysterectomy; Hysterectomy, total abdominal (N/A, 7/18/2019); and Thyroidectomy. Social History:  reports that she has been smoking cigarettes. She has been smoking about 0.20 packs per day. She has never used smokeless tobacco. She reports previous alcohol use of about 1.0 standard drinks of alcohol per week. She reports previous drug use. Drug: Marijuana. Family History: family history includes Cancer in her father; Diabetes in her mother; High Blood Pressure in her mother. The patients home medications have been reviewed. Allergies: Morphine    ------------------------- NURSING NOTES AND VITALS REVIEWED ---------------------------   The nursing notes within the ED encounter and vital signs as below have been reviewed by myself. /74   Pulse 92   Temp 98.5 °F (36.9 °C) (Tympanic)   Resp 16   LMP 06/01/2019   SpO2 97%   Oxygen Saturation Interpretation: Normal    The patients available past medical records and past encounters were reviewed. Physical exam:  Constitutional: The patient is comfortable, alert and oriented x3, well appearing, non toxic in NAD. Head: Atraumatic and normocephalic. Eyes: No discharge from the eyes the sclerae are normal.  ENT: The oropharynx is normal. No pharyngeal erythema, uvular edema, tonsillar exudates, asymmetry or trismus. Mouth is normal to inspection  With the exception of a pain on percussion of the tooth noted above. There is no evidence of facial asymmetry or abscess formation. Floor of the mouth is soft. No tenderness in the submental or submandibular space. No tongue elevation. She has significant dental caries noted throughout the mouth without any evidence of abscess formation. All of the teeth appear to be blackened and necrotic. Neck: The neck demonstrates normal range of motion. No meningeals signs are present. No stridor. Respiratory/chest: The chest is nontender.   Breath sounds are normal. no respiratory distress is noted  Cardiovascular: Heart shows a regular rate and rhythm no murmurs no rubs no gallops. Skin: The skin exam shows no evidence of rashes  Neuro: GCS is 15  Lymphatic: No cervical lymphadenopathy       -------------------------------------------------- RESULTS -------------------------------------------------  I have personally reviewed all laboratory and imaging results for this patient. Results are listed below. LABS:  No results found for this visit on 07/20/21. RADIOLOGY:  Interpreted by Radiologist.  No orders to display       She was provided with pain medication antibiotics and advised to keep her upcoming appointment with oral surgery.        Medical Decision Making: Exam and history c/w  dental pain without evidence of gross infection. At this time we will  the patient on following up in dental clinic and provide pain relief.       Impression:   1) Dental Pain  2) Dental Caries    Disposition: Discharge  Condition: Stable               Marija Scott, CHRISTY - CNP  07/20/21 [id number]

## 2024-02-12 ENCOUNTER — HOSPITAL ENCOUNTER (EMERGENCY)
Age: 49
Discharge: HOME | End: 2024-02-12
Attending: EMERGENCY MEDICINE
Payer: MEDICAID

## 2024-02-12 ENCOUNTER — APPOINTMENT (OUTPATIENT)
Dept: GENERAL RADIOLOGY | Age: 49
End: 2024-02-12
Payer: MEDICAID

## 2024-02-12 VITALS
RESPIRATION RATE: 16 BRPM | OXYGEN SATURATION: 100 % | WEIGHT: 207 LBS | TEMPERATURE: 98.4 F | HEIGHT: 69 IN | DIASTOLIC BLOOD PRESSURE: 90 MMHG | SYSTOLIC BLOOD PRESSURE: 120 MMHG | HEART RATE: 75 BPM | BODY MASS INDEX: 30.66 KG/M2

## 2024-02-12 DIAGNOSIS — S43.402A SPRAIN OF LEFT SHOULDER, UNSPECIFIED SHOULDER SPRAIN TYPE, INITIAL ENCOUNTER: Primary | ICD-10-CM

## 2024-02-12 DIAGNOSIS — J06.9 ACUTE UPPER RESPIRATORY INFECTION: ICD-10-CM

## 2024-02-12 PROCEDURE — 99283 EMERGENCY DEPT VISIT LOW MDM: CPT

## 2024-02-12 PROCEDURE — 73030 X-RAY EXAM OF SHOULDER: CPT

## 2024-02-12 RX ORDER — LORATADINE 10 MG/1
10 CAPSULE, LIQUID FILLED ORAL DAILY
COMMUNITY

## 2024-02-12 RX ORDER — DEXTROMETHORPHAN HYDROBROMIDE AND PROMETHAZINE HYDROCHLORIDE 15; 6.25 MG/5ML; MG/5ML
5 SYRUP ORAL 4 TIMES DAILY PRN
Qty: 120 ML | Refills: 0 | Status: SHIPPED | OUTPATIENT
Start: 2024-02-12 | End: 2024-02-19

## 2024-02-12 RX ORDER — AMLODIPINE BESYLATE 10 MG/1
10 TABLET ORAL DAILY
COMMUNITY

## 2024-02-12 RX ORDER — MULTIVIT-MIN/IRON/FOLIC ACID/K 18-600-40
CAPSULE ORAL
COMMUNITY

## 2024-02-12 RX ORDER — NAPROXEN 500 MG/1
500 TABLET ORAL 2 TIMES DAILY
Qty: 14 TABLET | Refills: 0 | Status: SHIPPED | OUTPATIENT
Start: 2024-02-12 | End: 2024-02-19

## 2024-02-12 RX ORDER — AMOXICILLIN AND CLAVULANATE POTASSIUM 875; 125 MG/1; MG/1
1 TABLET, FILM COATED ORAL 2 TIMES DAILY
Qty: 20 TABLET | Refills: 0 | Status: SHIPPED | OUTPATIENT
Start: 2024-02-12 | End: 2024-02-22

## 2024-02-13 NOTE — ED PROVIDER NOTES
PATIENT STATES MULTIPLE NEGATIVE COVID TEST AT HOME    The history is provided by the patient.   URI  Presenting symptoms: congestion, cough, facial pain and rhinorrhea    Presenting symptoms: no ear pain, no fatigue, no fever and no sore throat    Severity:  Moderate  Onset quality:  Gradual  Duration:  5 days  Chronicity:  New  Associated symptoms: no arthralgias, no headaches and no wheezing    Shoulder Problem  Location:  Shoulder  Shoulder location:  L shoulder  Injury: yes    Time since incident:  1 week  Mechanism of injury comment:  Liftinh  Pain details:     Quality:  Aching    Severity:  Mild  Associated symptoms: no back pain, no fatigue and no fever         Review of Systems   Constitutional:  Negative for chills, fatigue and fever.   HENT:  Positive for congestion and rhinorrhea. Negative for ear pain, sinus pressure and sore throat.    Eyes:  Negative for pain, discharge and redness.   Respiratory:  Positive for cough. Negative for shortness of breath and wheezing.    Cardiovascular:  Negative for chest pain.   Gastrointestinal:  Negative for abdominal distention, abdominal pain, blood in stool, diarrhea, nausea and vomiting.   Genitourinary:  Negative for dysuria and frequency.   Musculoskeletal:  Negative for arthralgias and back pain.   Skin:  Negative for rash and wound.   Neurological:  Negative for weakness and headaches.   Hematological:  Negative for adenopathy.   All other systems reviewed and are negative.       Physical Exam  Vitals and nursing note reviewed.   Constitutional:       Appearance: She is well-developed.   HENT:      Head: Normocephalic and atraumatic.      Right Ear: Hearing and external ear normal. Tympanic membrane is retracted.      Left Ear: Hearing and external ear normal. Tympanic membrane is retracted.      Nose: Mucosal edema, congestion and rhinorrhea present.      Mouth/Throat:      Pharynx: Uvula midline.   Eyes:      General: Lids are normal.

## 2024-03-14 ENCOUNTER — OFFICE VISIT (OUTPATIENT)
Age: 49
End: 2024-03-14
Payer: MEDICAID

## 2024-03-14 VITALS
HEART RATE: 92 BPM | DIASTOLIC BLOOD PRESSURE: 79 MMHG | BODY MASS INDEX: 30.75 KG/M2 | SYSTOLIC BLOOD PRESSURE: 136 MMHG | WEIGHT: 208.2 LBS

## 2024-03-14 DIAGNOSIS — N89.8 VAGINAL DISCHARGE: Primary | ICD-10-CM

## 2024-03-14 DIAGNOSIS — R23.2 HOT FLASHES: ICD-10-CM

## 2024-03-14 PROCEDURE — 3075F SYST BP GE 130 - 139MM HG: CPT | Performed by: LEGAL MEDICINE

## 2024-03-14 PROCEDURE — 4004F PT TOBACCO SCREEN RCVD TLK: CPT | Performed by: LEGAL MEDICINE

## 2024-03-14 PROCEDURE — 3078F DIAST BP <80 MM HG: CPT | Performed by: LEGAL MEDICINE

## 2024-03-14 PROCEDURE — G8427 DOCREV CUR MEDS BY ELIG CLIN: HCPCS | Performed by: LEGAL MEDICINE

## 2024-03-14 PROCEDURE — G8417 CALC BMI ABV UP PARAM F/U: HCPCS | Performed by: LEGAL MEDICINE

## 2024-03-14 PROCEDURE — G8484 FLU IMMUNIZE NO ADMIN: HCPCS | Performed by: LEGAL MEDICINE

## 2024-03-14 PROCEDURE — 99203 OFFICE O/P NEW LOW 30 MIN: CPT | Performed by: LEGAL MEDICINE

## 2024-03-14 RX ORDER — ALBUTEROL SULFATE 90 UG/1
AEROSOL, METERED RESPIRATORY (INHALATION)
COMMUNITY
Start: 2020-04-26

## 2024-03-14 RX ORDER — GABAPENTIN 600 MG/1
TABLET ORAL
COMMUNITY
Start: 2024-01-29

## 2024-03-14 RX ORDER — POLYETHYLENE GLYCOL 3350 17 G/17G
POWDER, FOR SOLUTION ORAL
COMMUNITY

## 2024-03-14 RX ORDER — FLUTICASONE PROPIONATE 50 MCG
SPRAY, SUSPENSION (ML) NASAL
COMMUNITY

## 2024-03-14 RX ORDER — MELOXICAM 7.5 MG/1
TABLET ORAL
COMMUNITY
Start: 2024-02-12

## 2024-03-14 RX ORDER — METRONIDAZOLE 500 MG/1
500 TABLET ORAL
Qty: 14 TABLET | Refills: 0 | Status: SHIPPED | OUTPATIENT
Start: 2024-03-14 | End: 2024-03-21

## 2024-03-14 RX ORDER — CHOLECALCIFEROL (VITAMIN D3) 125 MCG
CAPSULE ORAL
COMMUNITY
Start: 2024-01-05

## 2024-03-14 SDOH — ECONOMIC STABILITY: FOOD INSECURITY: WITHIN THE PAST 12 MONTHS, YOU WORRIED THAT YOUR FOOD WOULD RUN OUT BEFORE YOU GOT MONEY TO BUY MORE.: NEVER TRUE

## 2024-03-14 SDOH — ECONOMIC STABILITY: HOUSING INSECURITY
IN THE LAST 12 MONTHS, WAS THERE A TIME WHEN YOU DID NOT HAVE A STEADY PLACE TO SLEEP OR SLEPT IN A SHELTER (INCLUDING NOW)?: NO

## 2024-03-14 SDOH — ECONOMIC STABILITY: FOOD INSECURITY: WITHIN THE PAST 12 MONTHS, THE FOOD YOU BOUGHT JUST DIDN'T LAST AND YOU DIDN'T HAVE MONEY TO GET MORE.: NEVER TRUE

## 2024-03-14 SDOH — ECONOMIC STABILITY: INCOME INSECURITY: HOW HARD IS IT FOR YOU TO PAY FOR THE VERY BASICS LIKE FOOD, HOUSING, MEDICAL CARE, AND HEATING?: NOT HARD AT ALL

## 2024-03-14 ASSESSMENT — ENCOUNTER SYMPTOMS
NAUSEA: 0
ABDOMINAL DISTENTION: 0
DIARRHEA: 0
VOMITING: 0
ABDOMINAL PAIN: 0
CONSTIPATION: 0
BLOOD IN STOOL: 0
RECTAL PAIN: 0

## 2024-03-14 ASSESSMENT — PATIENT HEALTH QUESTIONNAIRE - PHQ9
SUM OF ALL RESPONSES TO PHQ QUESTIONS 1-9: 0
SUM OF ALL RESPONSES TO PHQ9 QUESTIONS 1 & 2: 0
2. FEELING DOWN, DEPRESSED OR HOPELESS: 0
SUM OF ALL RESPONSES TO PHQ QUESTIONS 1-9: 0
SUM OF ALL RESPONSES TO PHQ QUESTIONS 1-9: 0
1. LITTLE INTEREST OR PLEASURE IN DOING THINGS: 0
SUM OF ALL RESPONSES TO PHQ QUESTIONS 1-9: 0

## 2024-03-14 NOTE — PATIENT INSTRUCTIONS
Make sure to contact your family doctor to order the follow-up testing recommended by the radiologist after your mammogram at [facility].

## 2024-03-14 NOTE — PROGRESS NOTES
arm.  No atrophy or abnormal movements in the left arm.  Neuro: No deficit in left arm to touch.    Skin: Skin and subcu of chest, breast, arms with no lesions, rashes, or ulcers.  Skin and subcu of chest, breast, arms with no induration, nodules or tightening    Breast: Fibrocystic.                No skin lesions              No masses              Symmetric to inspection              Palpation reveals no masses or tenderness              No axillary adenopathy    Abdomen: Soft, nontender, no masses, no distention, no organomegaly, no hernia, normal bowel sounds.    Lymphatics: Groin nodes within normal limits.  No adenopathy.    Pelvic: External genitalia: Normal limits.  No lesions.                  Bladder, urethra, and meatus: Within normal limits                Vagina: White discharge, bacterial vaginosis              Adnexa: Non-tender                              No masses              Rectovaginal: Confirms      Extremities: No clubbing cyanosis or edema.    Neuro: CN II to XII are grossly intact.                                                                                                                    Gabbi was seen today for new patient.    Diagnoses and all orders for this visit:    Vaginal discharge  -     MISCELLANEOUS [facility] Molecular Testing; Future    Hot flashes  -     Follicle Stimulating Hormone; Future    Other orders  -     metroNIDAZOLE (FLAGYL) 500 MG tablet; Take 1 tablet by mouth in the morning and 1 tablet in the evening. Do all this for 7 days.          Return in about 1 year (around 3/14/2025).  She will contact her PCP regarding the follow-up needed for the abnormal mammogram at [facility].    Milady Groves MD

## 2024-03-18 DIAGNOSIS — N89.8 VAGINAL DISCHARGE: ICD-10-CM

## 2024-04-19 ENCOUNTER — TELEPHONE (OUTPATIENT)
Age: 49
End: 2024-04-19

## 2024-04-19 NOTE — TELEPHONE ENCOUNTER
Patient called in c/o vaginal odor , yellowish discharge and pain with urination.Patient states she was seen in march and was treated for a bacteria infection and she feels that it may be the same thing this time . Please advise?

## 2024-04-20 ENCOUNTER — HOSPITAL ENCOUNTER (EMERGENCY)
Age: 49
Discharge: HOME | End: 2024-04-20
Attending: EMERGENCY MEDICINE
Payer: MEDICAID

## 2024-04-20 VITALS
TEMPERATURE: 97.9 F | OXYGEN SATURATION: 97 % | DIASTOLIC BLOOD PRESSURE: 91 MMHG | SYSTOLIC BLOOD PRESSURE: 149 MMHG | RESPIRATION RATE: 16 BRPM | WEIGHT: 208 LBS | HEIGHT: 70 IN | BODY MASS INDEX: 29.78 KG/M2 | HEART RATE: 72 BPM

## 2024-04-20 DIAGNOSIS — B96.89 BACTERIAL VAGINOSIS: Primary | ICD-10-CM

## 2024-04-20 DIAGNOSIS — N76.0 BACTERIAL VAGINOSIS: Primary | ICD-10-CM

## 2024-04-20 LAB
BACTERIA URNS QL MICRO: ABNORMAL
BILIRUB UR QL STRIP: NEGATIVE
CLARITY UR: CLEAR
COLOR UR: YELLOW
EPI CELLS #/AREA URNS HPF: ABNORMAL /HPF
GLUCOSE UR STRIP-MCNC: NEGATIVE MG/DL
HGB UR QL STRIP.AUTO: ABNORMAL
KETONES UR STRIP-MCNC: NEGATIVE MG/DL
LEUKOCYTE ESTERASE UR QL STRIP: NEGATIVE
NITRITE UR QL STRIP: NEGATIVE
PH UR STRIP: 6.5 [PH] (ref 5–9)
PROT UR STRIP-MCNC: NEGATIVE MG/DL
RBC #/AREA URNS HPF: ABNORMAL /HPF
SP GR UR STRIP: 1.01 (ref 1–1.03)
UROBILINOGEN UR STRIP-ACNC: 0.2 EU/DL (ref 0–1)
WBC #/AREA URNS HPF: ABNORMAL /HPF

## 2024-04-20 PROCEDURE — 99283 EMERGENCY DEPT VISIT LOW MDM: CPT

## 2024-04-20 PROCEDURE — 81001 URINALYSIS AUTO W/SCOPE: CPT

## 2024-04-20 RX ORDER — METRONIDAZOLE 500 MG/1
500 TABLET ORAL 2 TIMES DAILY
Qty: 14 TABLET | Refills: 0 | Status: SHIPPED | OUTPATIENT
Start: 2024-04-20 | End: 2024-04-27

## 2024-04-20 ASSESSMENT — PAIN - FUNCTIONAL ASSESSMENT: PAIN_FUNCTIONAL_ASSESSMENT: NONE - DENIES PAIN

## 2024-04-20 NOTE — ED PROVIDER NOTES
HPI:  4/20/24,   Time: 1:23 PM EDT         Gabbi Franklin is a 48 y.o. female presenting to the ED for chief complaint: Patient presents today requesting to be checked for vaginal discharge and odor.  Patient seen in the last month and treated for bacterial vaginosis by Dr. Groves.  Patient had repeat symptoms in the last 3 to 4 days.  Patient's significant other is taking Keflex currently for UTI.    ROS:   Pertinent positives and negatives are stated within HPI, all other systems reviewed and are negative.  --------------------------------------------- PAST HISTORY ---------------------------------------------  Past Medical History:  has a past medical history of Anemia, Anxiety, Chronic lower back pain, H/O exercise stress test, History of blood transfusion, Hypertension, and Pancreatitis.    Past Surgical History:  has a past surgical history that includes Tubal ligation (2010); transthoracic echocardiogram (12/09/2015); other surgical history (11/17/2016); Hysterectomy; Hysterectomy, total abdominal (N/A, 7/18/2019); and Thyroidectomy.    Social History:  reports that she has been smoking cigarettes. She has never used smokeless tobacco. She reports that she does not currently use alcohol after a past usage of about 1.0 standard drink of alcohol per week. She reports that she does not currently use drugs after having used the following drugs: Marijuana (Weed).    Family History: family history includes Cancer in her father; Diabetes in her mother; High Blood Pressure in her mother.     The patient’s home medications have been reviewed.    Allergies: Morphine    -------------------------------------------------- RESULTS -------------------------------------------------  All laboratory and radiology results have been personally reviewed by myself   LABS:  Results for orders placed or performed during the hospital encounter of 04/20/24   Urinalysis with Microscopic   Result Value Ref Range    Color, UA Yellow

## 2024-07-25 ENCOUNTER — HOSPITAL ENCOUNTER (EMERGENCY)
Age: 49
Discharge: HOME | End: 2024-07-25
Attending: FAMILY MEDICINE
Payer: MEDICAID

## 2024-07-25 VITALS
HEIGHT: 71 IN | BODY MASS INDEX: 28.7 KG/M2 | TEMPERATURE: 98.1 F | SYSTOLIC BLOOD PRESSURE: 160 MMHG | WEIGHT: 205 LBS | OXYGEN SATURATION: 97 % | DIASTOLIC BLOOD PRESSURE: 93 MMHG | HEART RATE: 64 BPM | RESPIRATION RATE: 18 BRPM

## 2024-07-25 DIAGNOSIS — R00.2 PALPITATIONS: Primary | ICD-10-CM

## 2024-07-25 PROCEDURE — 93005 ELECTROCARDIOGRAM TRACING: CPT | Performed by: FAMILY MEDICINE

## 2024-07-25 PROCEDURE — 99283 EMERGENCY DEPT VISIT LOW MDM: CPT

## 2024-07-26 LAB
EKG ATRIAL RATE: 61 BPM
EKG P AXIS: 5 DEGREES
EKG P-R INTERVAL: 218 MS
EKG Q-T INTERVAL: 412 MS
EKG QRS DURATION: 76 MS
EKG QTC CALCULATION (BAZETT): 414 MS
EKG R AXIS: 6 DEGREES
EKG T AXIS: 42 DEGREES
EKG VENTRICULAR RATE: 61 BPM

## 2024-07-26 PROCEDURE — 93010 ELECTROCARDIOGRAM REPORT: CPT | Performed by: INTERNAL MEDICINE

## 2024-07-26 NOTE — ED PROVIDER NOTES
HPI:  24,   Time: 8:04 PM EDT         Gabbi Franklin is a 48 y.o. female presenting to the ED for intermittent palpitations over the past 3 days, she denies feeling her heart racing or pounding.  She complains of more of a fluttering sensation.  She had a complete cardiac workup about 2 years ago when her son .  She had a complete workup by  Emergency room.  No heart abnormality was found at that time.            ROS:   Pertinent positives and negatives are stated within HPI, all other systems reviewed and are negative.  --------------------------------------------- PAST HISTORY ---------------------------------------------  Past Medical History:  has a past medical history of Anemia, Anxiety, Chronic lower back pain, H/O exercise stress test, History of blood transfusion, Hypertension, and Pancreatitis.    Past Surgical History:  has a past surgical history that includes Tubal ligation (); transthoracic echocardiogram (2015); other surgical history (2016); Hysterectomy; Hysterectomy, total abdominal (N/A, 2019); and Thyroidectomy.    Social History:  reports that she has been smoking cigarettes. She has never used smokeless tobacco. She reports that she does not currently use alcohol after a past usage of about 1.0 standard drink of alcohol per week. She reports that she does not currently use drugs after having used the following drugs: Marijuana (Weed).    Family History: family history includes Cancer in her father; Diabetes in her mother; High Blood Pressure in her mother.     The patient’s home medications have been reviewed.    Allergies: Morphine    -------------------------------------------------- RESULTS -------------------------------------------------  All laboratory and radiology results have been personally reviewed by myself   LABS:  Results for orders placed or performed during the hospital encounter of 24   EKG 12 Lead   Result Value Ref Range    Ventricular

## 2024-09-19 ENCOUNTER — HOSPITAL ENCOUNTER (EMERGENCY)
Age: 49
Discharge: HOME | End: 2024-09-19
Attending: FAMILY MEDICINE
Payer: MEDICAID

## 2024-09-19 VITALS
HEART RATE: 64 BPM | SYSTOLIC BLOOD PRESSURE: 132 MMHG | TEMPERATURE: 97.2 F | DIASTOLIC BLOOD PRESSURE: 86 MMHG | HEIGHT: 71 IN | WEIGHT: 204 LBS | OXYGEN SATURATION: 98 % | BODY MASS INDEX: 28.56 KG/M2 | RESPIRATION RATE: 18 BRPM

## 2024-09-19 DIAGNOSIS — S39.012A STRAIN OF LUMBAR REGION, INITIAL ENCOUNTER: Primary | ICD-10-CM

## 2024-09-19 DIAGNOSIS — R61 NIGHT SWEATS: ICD-10-CM

## 2024-09-19 LAB
BILIRUB UR QL STRIP: NEGATIVE
CLARITY UR: CLEAR
COLOR UR: YELLOW
EPI CELLS #/AREA URNS HPF: NORMAL /HPF
GLUCOSE UR STRIP-MCNC: NEGATIVE MG/DL
HGB UR QL STRIP.AUTO: NEGATIVE
KETONES UR STRIP-MCNC: NEGATIVE MG/DL
LEUKOCYTE ESTERASE UR QL STRIP: NEGATIVE
NITRITE UR QL STRIP: NEGATIVE
PH UR STRIP: 6 [PH] (ref 5–9)
PROT UR STRIP-MCNC: NEGATIVE MG/DL
RBC #/AREA URNS HPF: NORMAL /HPF
SP GR UR STRIP: 1.01 (ref 1–1.03)
UROBILINOGEN UR STRIP-ACNC: 0.2 EU/DL (ref 0–1)
WBC #/AREA URNS HPF: NORMAL /HPF

## 2024-09-19 PROCEDURE — 81001 URINALYSIS AUTO W/SCOPE: CPT

## 2024-09-19 PROCEDURE — 99284 EMERGENCY DEPT VISIT MOD MDM: CPT

## 2024-09-19 PROCEDURE — 96372 THER/PROPH/DIAG INJ SC/IM: CPT

## 2024-09-19 PROCEDURE — 6360000002 HC RX W HCPCS: Performed by: FAMILY MEDICINE

## 2024-09-19 RX ORDER — NAPROXEN 500 MG/1
500 TABLET ORAL 2 TIMES DAILY
Qty: 20 TABLET | Refills: 0 | Status: SHIPPED | OUTPATIENT
Start: 2024-09-19 | End: 2024-09-29

## 2024-09-19 RX ORDER — CYCLOBENZAPRINE HCL 10 MG
10 TABLET ORAL 3 TIMES DAILY PRN
Qty: 21 TABLET | Refills: 0 | Status: SHIPPED | OUTPATIENT
Start: 2024-09-19 | End: 2024-09-29

## 2024-09-19 RX ORDER — KETOROLAC TROMETHAMINE 30 MG/ML
30 INJECTION, SOLUTION INTRAMUSCULAR; INTRAVENOUS ONCE
Status: COMPLETED | OUTPATIENT
Start: 2024-09-19 | End: 2024-09-19

## 2024-09-19 RX ADMIN — KETOROLAC TROMETHAMINE 30 MG: 30 INJECTION, SOLUTION INTRAMUSCULAR at 13:11

## 2024-09-19 ASSESSMENT — PAIN DESCRIPTION - DESCRIPTORS: DESCRIPTORS: ACHING;DISCOMFORT

## 2024-09-19 ASSESSMENT — PAIN DESCRIPTION - LOCATION: LOCATION: ABDOMEN;FLANK

## 2024-09-19 ASSESSMENT — PAIN DESCRIPTION - PAIN TYPE: TYPE: ACUTE PAIN

## 2024-09-19 ASSESSMENT — PAIN - FUNCTIONAL ASSESSMENT
PAIN_FUNCTIONAL_ASSESSMENT: 0-10
PAIN_FUNCTIONAL_ASSESSMENT: PREVENTS OR INTERFERES SOME ACTIVE ACTIVITIES AND ADLS

## 2024-09-19 ASSESSMENT — PAIN DESCRIPTION - FREQUENCY: FREQUENCY: CONTINUOUS

## 2024-09-19 ASSESSMENT — PAIN SCALES - GENERAL: PAINLEVEL_OUTOF10: 7

## 2024-09-19 ASSESSMENT — PAIN DESCRIPTION - ORIENTATION: ORIENTATION: LEFT;LOWER

## 2024-11-22 ENCOUNTER — APPOINTMENT (OUTPATIENT)
Dept: ULTRASOUND IMAGING | Age: 49
End: 2024-11-22
Payer: MEDICAID

## 2024-11-22 ENCOUNTER — HOSPITAL ENCOUNTER (EMERGENCY)
Age: 49
Discharge: HOME | End: 2024-11-22
Attending: FAMILY MEDICINE
Payer: MEDICAID

## 2024-11-22 VITALS
RESPIRATION RATE: 16 BRPM | HEART RATE: 80 BPM | SYSTOLIC BLOOD PRESSURE: 140 MMHG | TEMPERATURE: 97.7 F | DIASTOLIC BLOOD PRESSURE: 88 MMHG | OXYGEN SATURATION: 99 %

## 2024-11-22 DIAGNOSIS — R10.2 SUPRAPUBIC PAIN: Primary | ICD-10-CM

## 2024-11-22 LAB
BILIRUB UR QL STRIP: NEGATIVE
CLARITY UR: CLEAR
COLOR UR: YELLOW
COMMENT: NORMAL
GLUCOSE UR STRIP-MCNC: NEGATIVE MG/DL
HGB UR QL STRIP.AUTO: NEGATIVE
KETONES UR STRIP-MCNC: NEGATIVE MG/DL
LEUKOCYTE ESTERASE UR QL STRIP: NEGATIVE
NITRITE UR QL STRIP: NEGATIVE
PH UR STRIP: 7 [PH] (ref 5–9)
PROT UR STRIP-MCNC: NEGATIVE MG/DL
SP GR UR STRIP: 1.01 (ref 1–1.03)
UROBILINOGEN UR STRIP-ACNC: 0.2 EU/DL (ref 0–1)

## 2024-11-22 PROCEDURE — 76856 US EXAM PELVIC COMPLETE: CPT

## 2024-11-22 PROCEDURE — 76775 US EXAM ABDO BACK WALL LIM: CPT

## 2024-11-22 PROCEDURE — 99284 EMERGENCY DEPT VISIT MOD MDM: CPT

## 2024-11-22 PROCEDURE — 93976 VASCULAR STUDY: CPT

## 2024-11-22 RX ORDER — SULFAMETHOXAZOLE AND TRIMETHOPRIM 800; 160 MG/1; MG/1
1 TABLET ORAL 2 TIMES DAILY
Qty: 14 TABLET | Refills: 0 | Status: SHIPPED | OUTPATIENT
Start: 2024-11-22 | End: 2024-11-29

## 2024-11-22 ASSESSMENT — PAIN DESCRIPTION - LOCATION: LOCATION: BACK

## 2024-11-22 ASSESSMENT — PAIN DESCRIPTION - DESCRIPTORS: DESCRIPTORS: ACHING;BURNING

## 2024-11-22 ASSESSMENT — PAIN - FUNCTIONAL ASSESSMENT
PAIN_FUNCTIONAL_ASSESSMENT: ACTIVITIES ARE NOT PREVENTED
PAIN_FUNCTIONAL_ASSESSMENT: 0-10

## 2024-11-22 ASSESSMENT — PAIN SCALES - GENERAL: PAINLEVEL_OUTOF10: 8

## 2024-11-22 ASSESSMENT — PAIN DESCRIPTION - ORIENTATION: ORIENTATION: RIGHT

## 2024-11-22 ASSESSMENT — PAIN DESCRIPTION - PAIN TYPE: TYPE: ACUTE PAIN

## 2024-11-22 NOTE — ED PROVIDER NOTES
Urgent Care Encounter       Patient: Gabbi Franklin  MRN: [id number]  : 1975  Date of Evaluation: 2024  ED Provider: Nataliya Garcia MD    Chief Complaint       Chief Complaint   Patient presents with    Back Pain     Pt states right sided low back pain that radiates down to the groin and radiates down the right leg to the thigh times 2-3 days. No recent injury. Hx of sciatica on the right side.     [employer] [place] [employer] [place]     Gabbi Franklin is a 48 y.o. female who presents to the [facility] (Urgent care Facility)    Patient presents with suprapubic pain that started about 4 days ago.  Patient was at work and had very severe.  Suprapubic pain this morning.  She states it was so severe that it caused her to stop working and she came to the urgent care.  She states that lately she has felt that she has not had any urinary complaints per se, however that sometimes when she urinates she feels like she is not completely voiding and emptying out her bladder.  We did do a urine analysis here and her urine workup appears to be within normal limits.  We will proceed and do an ultrasound of the kidneys and an ultrasound of the uterus and the ovaries to evaluate this further.    Ultrasound imaging is within normal limits.  I wanted to talk to patient however when I got the call from radiology at [facility] it appears that patient has already left AMA.  ROS:     At least 6 systems reviewed and otherwise acutely negative except as in the [employer] [place] [employer] [place].  Review of Systems   Genitourinary:  Positive for pelvic pain. Negative for decreased urine volume, difficulty urinating, dyspareunia, dysuria, enuresis, flank pain, frequency, genital sores, hematuria, menstrual problem, urgency, vaginal bleeding, vaginal discharge and vaginal pain.   Pelvic pain here is specifically suprapubic pain      Past History     Past Medical History:   Diagnosis Date    Anemia     has had transfusions, iron infusions 3/2015

## 2025-01-06 ENCOUNTER — HOSPITAL ENCOUNTER (EMERGENCY)
Age: 50
Discharge: HOME | End: 2025-01-06
Attending: FAMILY MEDICINE
Payer: MEDICAID

## 2025-01-06 ENCOUNTER — APPOINTMENT (OUTPATIENT)
Dept: GENERAL RADIOLOGY | Age: 50
End: 2025-01-06
Payer: MEDICAID

## 2025-01-06 VITALS
SYSTOLIC BLOOD PRESSURE: 147 MMHG | DIASTOLIC BLOOD PRESSURE: 80 MMHG | WEIGHT: 205 LBS | OXYGEN SATURATION: 99 % | BODY MASS INDEX: 28.7 KG/M2 | TEMPERATURE: 98.1 F | RESPIRATION RATE: 16 BRPM | HEART RATE: 72 BPM

## 2025-01-06 DIAGNOSIS — J06.9 ACUTE UPPER RESPIRATORY INFECTION: Primary | ICD-10-CM

## 2025-01-06 DIAGNOSIS — J45.901 EXACERBATION OF ASTHMA, UNSPECIFIED ASTHMA SEVERITY, UNSPECIFIED WHETHER PERSISTENT: ICD-10-CM

## 2025-01-06 PROCEDURE — 71046 X-RAY EXAM CHEST 2 VIEWS: CPT

## 2025-01-06 PROCEDURE — 99284 EMERGENCY DEPT VISIT MOD MDM: CPT

## 2025-01-06 PROCEDURE — 96372 THER/PROPH/DIAG INJ SC/IM: CPT

## 2025-01-06 PROCEDURE — 6360000002 HC RX W HCPCS: Performed by: FAMILY MEDICINE

## 2025-01-06 RX ORDER — PREDNISONE 20 MG/1
40 TABLET ORAL DAILY
Qty: 8 TABLET | Refills: 0 | Status: SHIPPED | OUTPATIENT
Start: 2025-01-06 | End: 2025-01-10

## 2025-01-06 RX ORDER — DEXAMETHASONE SODIUM PHOSPHATE 10 MG/ML
8 INJECTION, SOLUTION INTRAMUSCULAR; INTRAVENOUS ONCE
Status: COMPLETED | OUTPATIENT
Start: 2025-01-06 | End: 2025-01-06

## 2025-01-06 RX ORDER — PREDNISONE 20 MG/1
60 TABLET ORAL ONCE
Status: DISCONTINUED | OUTPATIENT
Start: 2025-01-06 | End: 2025-01-06

## 2025-01-06 RX ORDER — GUAIFENESIN AND DEXTROMETHORPHAN HYDROBROMIDE 1200; 60 MG/1; MG/1
1 TABLET, EXTENDED RELEASE ORAL 2 TIMES DAILY
Qty: 28 TABLET | Refills: 0 | Status: SHIPPED | OUTPATIENT
Start: 2025-01-06

## 2025-01-06 RX ADMIN — DEXAMETHASONE SODIUM PHOSPHATE 8 MG: 10 INJECTION INTRAMUSCULAR; INTRAVENOUS at 11:27

## 2025-01-06 ASSESSMENT — PAIN - FUNCTIONAL ASSESSMENT: PAIN_FUNCTIONAL_ASSESSMENT: NONE - DENIES PAIN

## 2025-01-06 NOTE — ED PROVIDER NOTES
HPI:  1/6/25,   Time: 10:32 AM KAUSHIK Franklin is a 49 y.o. female presenting to the ED for 3 to 4 days of pleuritic type mid chest pain, it is pain that is worse with rotating the and lifting the arms and with the deep breathing.  She denies any significant cough.  She does have a history of asthma/COPD, she has both a nebulizer machine which she used last night and this morning that helped a little bit with her chest tightness but she does complain of some chest tightness with that with the breathing.  She has some mild nasal congestion but no nasal discharge.  She denies fever chills or bodyaches.  She quit smoking about a month ago, after smoking from the time she was about age 13.      ROS:   Pertinent positives and negatives are stated within HPI, all other systems reviewed and are negative.  --------------------------------------------- PAST HISTORY ---------------------------------------------  Past Medical History:  has a past medical history of Anemia, Anxiety, Chronic lower back pain, H/O exercise stress test, History of blood transfusion, Hypertension, and Pancreatitis.    Past Surgical History:  has a past surgical history that includes Tubal ligation (2010); transthoracic echocardiogram (12/09/2015); other surgical history (11/17/2016); Hysterectomy; Hysterectomy, total abdominal (N/A, 7/18/2019); and Thyroidectomy.    Social History:  reports that she has quit smoking. Her smoking use included cigarettes. She has never used smokeless tobacco. She reports that she does not currently use alcohol after a past usage of about 1.0 standard drink of alcohol per week. She reports that she does not currently use drugs after having used the following drugs: Marijuana (Weed).    Family History: family history includes Cancer in her father; Diabetes in her mother; High Blood Pressure in her mother.     The patient’s home medications have been reviewed.    Allergies:

## 2025-01-27 ENCOUNTER — APPOINTMENT (OUTPATIENT)
Dept: GENERAL RADIOLOGY | Age: 50
End: 2025-01-27
Payer: MEDICAID

## 2025-01-27 ENCOUNTER — HOSPITAL ENCOUNTER (EMERGENCY)
Age: 50
Discharge: HOME | End: 2025-01-27
Attending: EMERGENCY MEDICINE
Payer: MEDICAID

## 2025-01-27 ENCOUNTER — APPOINTMENT (OUTPATIENT)
Dept: CT IMAGING | Age: 50
End: 2025-01-27
Payer: MEDICAID

## 2025-01-27 VITALS
DIASTOLIC BLOOD PRESSURE: 93 MMHG | HEART RATE: 69 BPM | TEMPERATURE: 98.6 F | RESPIRATION RATE: 20 BRPM | WEIGHT: 215 LBS | HEIGHT: 70 IN | OXYGEN SATURATION: 99 % | SYSTOLIC BLOOD PRESSURE: 135 MMHG | BODY MASS INDEX: 30.78 KG/M2

## 2025-01-27 DIAGNOSIS — U07.1 COVID-19: ICD-10-CM

## 2025-01-27 DIAGNOSIS — N30.00 ACUTE CYSTITIS WITHOUT HEMATURIA: Primary | ICD-10-CM

## 2025-01-27 LAB
ANION GAP SERPL CALCULATED.3IONS-SCNC: 9 MMOL/L (ref 7–16)
BACTERIA URNS QL MICRO: ABNORMAL
BILIRUB UR QL STRIP: NEGATIVE
BUN SERPL-MCNC: 10 MG/DL (ref 6–20)
CALCIUM SERPL-MCNC: 9.1 MG/DL (ref 8.6–10.2)
CHLORIDE SERPL-SCNC: 101 MMOL/L (ref 98–107)
CLARITY UR: CLEAR
CO2 SERPL-SCNC: 25 MMOL/L (ref 22–29)
COLOR UR: YELLOW
CREAT SERPL-MCNC: 0.6 MG/DL (ref 0.5–1)
EKG ATRIAL RATE: 68 BPM
EKG P AXIS: 8 DEGREES
EKG P-R INTERVAL: 182 MS
EKG Q-T INTERVAL: 408 MS
EKG QRS DURATION: 86 MS
EKG QTC CALCULATION (BAZETT): 433 MS
EKG R AXIS: 25 DEGREES
EKG T AXIS: 63 DEGREES
EKG VENTRICULAR RATE: 68 BPM
EPI CELLS #/AREA URNS HPF: ABNORMAL /HPF
ERYTHROCYTE [DISTWIDTH] IN BLOOD BY AUTOMATED COUNT: 13.4 % (ref 11.5–15)
FLUAV RNA RESP QL NAA+PROBE: NOT DETECTED
FLUBV RNA RESP QL NAA+PROBE: NOT DETECTED
GFR, ESTIMATED: >90 ML/MIN/1.73M2
GLUCOSE SERPL-MCNC: 116 MG/DL (ref 74–99)
GLUCOSE UR STRIP-MCNC: NEGATIVE MG/DL
HCG, URINE, POC: NEGATIVE
HCT VFR BLD AUTO: 39.9 % (ref 34–48)
HGB BLD-MCNC: 12.9 G/DL (ref 11.5–15.5)
HGB UR QL STRIP.AUTO: NEGATIVE
KETONES UR STRIP-MCNC: NEGATIVE MG/DL
LEUKOCYTE ESTERASE UR QL STRIP: NEGATIVE
Lab: NORMAL
MCH RBC QN AUTO: 28.8 PG (ref 26–35)
MCHC RBC AUTO-ENTMCNC: 32.3 G/DL (ref 32–34.5)
MCV RBC AUTO: 89.1 FL (ref 80–99.9)
NEGATIVE QC PASS/FAIL: NORMAL
NITRITE UR QL STRIP: POSITIVE
PH UR STRIP: 6 [PH] (ref 5–9)
PLATELET # BLD AUTO: 249 K/UL (ref 130–450)
PMV BLD AUTO: 9.9 FL (ref 7–12)
POSITIVE QC PASS/FAIL: NORMAL
POTASSIUM SERPL-SCNC: 4.2 MMOL/L (ref 3.5–5)
PROT UR STRIP-MCNC: NEGATIVE MG/DL
RBC # BLD AUTO: 4.48 M/UL (ref 3.5–5.5)
RBC #/AREA URNS HPF: ABNORMAL /HPF
SARS-COV-2 RNA RESP QL NAA+PROBE: DETECTED
SODIUM SERPL-SCNC: 135 MMOL/L (ref 132–146)
SOURCE: ABNORMAL
SP GR UR STRIP: 1.01 (ref 1–1.03)
SPECIMEN DESCRIPTION: ABNORMAL
TROPONIN I SERPL HS-MCNC: <6 NG/L (ref 0–9)
UROBILINOGEN UR STRIP-ACNC: 0.2 EU/DL (ref 0–1)
WBC #/AREA URNS HPF: ABNORMAL /HPF
WBC OTHER # BLD: 3.7 K/UL (ref 4.5–11.5)
YEAST URNS QL MICRO: PRESENT

## 2025-01-27 PROCEDURE — 99285 EMERGENCY DEPT VISIT HI MDM: CPT

## 2025-01-27 PROCEDURE — 6360000004 HC RX CONTRAST MEDICATION: Performed by: RADIOLOGY

## 2025-01-27 PROCEDURE — 74177 CT ABD & PELVIS W/CONTRAST: CPT

## 2025-01-27 PROCEDURE — 85027 COMPLETE CBC AUTOMATED: CPT

## 2025-01-27 PROCEDURE — 96374 THER/PROPH/DIAG INJ IV PUSH: CPT

## 2025-01-27 PROCEDURE — 80048 BASIC METABOLIC PNL TOTAL CA: CPT

## 2025-01-27 PROCEDURE — 93010 ELECTROCARDIOGRAM REPORT: CPT | Performed by: INTERNAL MEDICINE

## 2025-01-27 PROCEDURE — 6360000002 HC RX W HCPCS: Performed by: EMERGENCY MEDICINE

## 2025-01-27 PROCEDURE — 81001 URINALYSIS AUTO W/SCOPE: CPT

## 2025-01-27 PROCEDURE — 87636 SARSCOV2 & INF A&B AMP PRB: CPT

## 2025-01-27 PROCEDURE — 84484 ASSAY OF TROPONIN QUANT: CPT

## 2025-01-27 PROCEDURE — 87086 URINE CULTURE/COLONY COUNT: CPT

## 2025-01-27 PROCEDURE — 71046 X-RAY EXAM CHEST 2 VIEWS: CPT

## 2025-01-27 PROCEDURE — 93005 ELECTROCARDIOGRAM TRACING: CPT | Performed by: EMERGENCY MEDICINE

## 2025-01-27 RX ORDER — CEFDINIR 300 MG/1
300 CAPSULE ORAL 2 TIMES DAILY
Qty: 14 CAPSULE | Refills: 0 | Status: SHIPPED | OUTPATIENT
Start: 2025-01-27 | End: 2025-02-03

## 2025-01-27 RX ORDER — KETOROLAC TROMETHAMINE 15 MG/ML
15 INJECTION, SOLUTION INTRAMUSCULAR; INTRAVENOUS ONCE
Status: COMPLETED | OUTPATIENT
Start: 2025-01-27 | End: 2025-01-27

## 2025-01-27 RX ORDER — IOPAMIDOL 755 MG/ML
75 INJECTION, SOLUTION INTRAVASCULAR
Status: COMPLETED | OUTPATIENT
Start: 2025-01-27 | End: 2025-01-27

## 2025-01-27 RX ADMIN — KETOROLAC TROMETHAMINE 15 MG: 15 INJECTION, SOLUTION INTRAMUSCULAR; INTRAVENOUS at 10:10

## 2025-01-27 RX ADMIN — IOPAMIDOL 75 ML: 755 INJECTION, SOLUTION INTRAVENOUS at 11:38

## 2025-01-27 ASSESSMENT — PAIN DESCRIPTION - ORIENTATION: ORIENTATION: RIGHT

## 2025-01-27 ASSESSMENT — ENCOUNTER SYMPTOMS
CONSTIPATION: 0
COUGH: 0
DIARRHEA: 0
BACK PAIN: 1
VOMITING: 0
SHORTNESS OF BREATH: 0
NAUSEA: 0
ABDOMINAL PAIN: 1

## 2025-01-27 ASSESSMENT — PAIN DESCRIPTION - DESCRIPTORS
DESCRIPTORS: DISCOMFORT
DESCRIPTORS: DISCOMFORT

## 2025-01-27 ASSESSMENT — PAIN SCALES - GENERAL
PAINLEVEL_OUTOF10: 8
PAINLEVEL_OUTOF10: 9

## 2025-01-27 ASSESSMENT — PAIN - FUNCTIONAL ASSESSMENT: PAIN_FUNCTIONAL_ASSESSMENT: 0-10

## 2025-01-27 ASSESSMENT — LIFESTYLE VARIABLES
HOW MANY STANDARD DRINKS CONTAINING ALCOHOL DO YOU HAVE ON A TYPICAL DAY: PATIENT DOES NOT DRINK
HOW OFTEN DO YOU HAVE A DRINK CONTAINING ALCOHOL: NEVER

## 2025-01-27 ASSESSMENT — PAIN DESCRIPTION - LOCATION
LOCATION: BACK;CHEST;GROIN
LOCATION: ABDOMEN;BACK

## 2025-01-27 NOTE — ED PROVIDER NOTES
Patient presents emergency department with complaint of right groin pain for the past 2 days.  She states that it radiates into her back.  She is also experiencing some right sided neck discomfort that radiates around to the front of her chest.  She states that she thinks this is all caused from a urinary tract infection and she has been doing home remedies without any relief.  She denies any fevers or chills.  She denies any cough or shortness of breath.  She denies any nausea vomiting diarrhea or constipation.  She does admit to dysuria and change in frequency.  Patient states that she has history of thyroid disease, asthma and hypertension.  She denies any heart disease states that she had a stress test by Dr. Coughlin that was negative a few years ago.    The history is provided by the patient.       Review of Systems   Constitutional:  Positive for activity change. Negative for appetite change, chills, diaphoresis, fatigue and fever.   HENT: Negative.     Respiratory:  Negative for cough and shortness of breath.    Cardiovascular:  Positive for chest pain. Negative for palpitations and leg swelling.   Gastrointestinal:  Positive for abdominal pain. Negative for constipation, diarrhea, nausea and vomiting.   Genitourinary:  Positive for dysuria, flank pain, frequency and urgency. Negative for vaginal bleeding, vaginal discharge and vaginal pain.   Musculoskeletal:  Positive for back pain (Right lower back).   Skin: Negative.    Neurological: Negative.    Psychiatric/Behavioral: Negative.         Physical Exam  Vitals and nursing note reviewed.   Constitutional:       General: She is not in acute distress.     Appearance: Normal appearance. She is well-developed and normal weight. She is not ill-appearing, toxic-appearing or diaphoretic.   HENT:      Head: Normocephalic and atraumatic.      Nose: Nose normal.      Mouth/Throat:      Mouth: Mucous membranes are moist.      Pharynx: Oropharynx is clear.   Eyes:

## 2025-01-28 LAB
MICROORGANISM SPEC CULT: NO GROWTH
SERVICE CMNT-IMP: NORMAL
SPECIMEN DESCRIPTION: NORMAL

## 2025-02-26 PROBLEM — N39.0 FREQUENT UTI: Status: ACTIVE | Noted: 2025-02-26

## 2025-02-26 PROBLEM — M51.362 DEGENERATION OF INTERVERTEBRAL DISC OF LUMBAR REGION WITH DISCOGENIC BACK PAIN AND LOWER EXTREMITY PAIN: Status: ACTIVE | Noted: 2025-02-26

## 2025-02-26 PROBLEM — Z91.09 ENVIRONMENTAL ALLERGIES: Status: ACTIVE | Noted: 2025-02-26

## 2025-02-26 PROBLEM — E89.0 POSTOPERATIVE HYPOTHYROIDISM: Status: ACTIVE | Noted: 2025-02-26

## 2025-02-26 PROBLEM — D62 ACUTE BLOOD LOSS ANEMIA: Status: RESOLVED | Noted: 2018-02-21 | Resolved: 2025-02-26

## 2025-05-12 ENCOUNTER — HOSPITAL ENCOUNTER (OUTPATIENT)
Age: 50
Discharge: HOME | End: 2025-05-14
Payer: MEDICAID

## 2025-05-12 ENCOUNTER — HOSPITAL ENCOUNTER (OUTPATIENT)
Dept: GENERAL RADIOLOGY | Age: 50
Discharge: HOME | End: 2025-05-14
Payer: MEDICAID

## 2025-05-12 DIAGNOSIS — M51.362 DEGENERATION OF INTERVERTEBRAL DISC OF LUMBAR REGION WITH DISCOGENIC BACK PAIN AND LOWER EXTREMITY PAIN: ICD-10-CM

## 2025-05-12 DIAGNOSIS — M54.50 CHRONIC LOW BACK PAIN, UNSPECIFIED BACK PAIN LATERALITY, UNSPECIFIED WHETHER SCIATICA PRESENT: ICD-10-CM

## 2025-05-12 DIAGNOSIS — M25.511 RIGHT SHOULDER PAIN, UNSPECIFIED CHRONICITY: ICD-10-CM

## 2025-05-12 DIAGNOSIS — G89.29 CHRONIC LOW BACK PAIN, UNSPECIFIED BACK PAIN LATERALITY, UNSPECIFIED WHETHER SCIATICA PRESENT: ICD-10-CM

## 2025-05-12 PROCEDURE — 73030 X-RAY EXAM OF SHOULDER: CPT

## 2025-05-12 PROCEDURE — 72100 X-RAY EXAM L-S SPINE 2/3 VWS: CPT

## 2025-06-03 ENCOUNTER — OFFICE VISIT (OUTPATIENT)
Dept: ORTHOPEDIC SURGERY | Age: 50
End: 2025-06-03
Payer: MEDICAID

## 2025-06-03 VITALS — HEIGHT: 70 IN | BODY MASS INDEX: 32.21 KG/M2 | WEIGHT: 225 LBS

## 2025-06-03 DIAGNOSIS — M19.011 GLENOHUMERAL ARTHRITIS, RIGHT: Primary | ICD-10-CM

## 2025-06-03 PROCEDURE — 1036F TOBACCO NON-USER: CPT | Performed by: ORTHOPAEDIC SURGERY

## 2025-06-03 PROCEDURE — G8427 DOCREV CUR MEDS BY ELIG CLIN: HCPCS | Performed by: ORTHOPAEDIC SURGERY

## 2025-06-03 PROCEDURE — 20610 DRAIN/INJ JOINT/BURSA W/O US: CPT | Performed by: ORTHOPAEDIC SURGERY

## 2025-06-03 PROCEDURE — 99203 OFFICE O/P NEW LOW 30 MIN: CPT | Performed by: ORTHOPAEDIC SURGERY

## 2025-06-03 PROCEDURE — G8417 CALC BMI ABV UP PARAM F/U: HCPCS | Performed by: ORTHOPAEDIC SURGERY

## 2025-06-03 RX ORDER — TRIAMCINOLONE ACETONIDE 40 MG/ML
40 INJECTION, SUSPENSION INTRA-ARTICULAR; INTRAMUSCULAR ONCE
Status: COMPLETED | OUTPATIENT
Start: 2025-06-03 | End: 2025-06-03

## 2025-06-03 RX ADMIN — TRIAMCINOLONE ACETONIDE 40 MG: 40 INJECTION, SUSPENSION INTRA-ARTICULAR; INTRAMUSCULAR at 11:56

## 2025-06-03 NOTE — PROGRESS NOTES
Chief Complaint   Patient presents with    Shoulder Pain     Right shoulder- severe arthritis in right shoulder   Onset of arthritis a little over a year   States pain is a sharp and stabbing pain, sometimes it will radiate down right arm. States sometimes she has trouble lifting right arm up.   Patient is in PT- she started about 2 months ago.   Pain level today 6         Gabbi Franklin is a 49 y.o. year old   female who is seen today  for evaluation of right shoulder pain.  She reports the pain has been ongoing for the past 1 years.  She does recall a specific injury which started the pain.   She reports the pain is worse with movement, better with rest.  The patient does not have mechanical symptoms.  Shedoes have night pain.  She denies a feeling of instability.  The prior treatments have been OTC medications.  The patient   has not responded to the treatment. The patient is right hand dominant. The patient is working.       Chief Complaint   Patient presents with    Shoulder Pain     Right shoulder- severe arthritis in right shoulder   Onset of arthritis a little over a year   States pain is a sharp and stabbing pain, sometimes it will radiate down right arm. States sometimes she has trouble lifting right arm up.   Patient is in PT- she started about 2 months ago.   Pain level today 6      Past Medical History:   Diagnosis Date    Anemia 2015    Anxiety     Chronic lower back pain     H/O exercise stress test 2012    History of blood transfusion     Hypertension     Pancreatitis 2015    treated by Dr Peres, [facility]     Past Surgical History:   Procedure Laterality Date    HYSTERECTOMY (CERVIX STATUS UNKNOWN)      HYSTERECTOMY, TOTAL ABDOMINAL (CERVIX REMOVED) N/A 7/18/2019    TOTAL ABDOMINAL HYSTERECTOMY WITH BILATERAL SALPINGCTOMY performed by Gaurav Hernandes MD at [facility] OR    OTHER SURGICAL HISTORY  11/17/2016    Diagnostic Laparoscopy and Lysis of Adhesions    THYROIDECTOMY      TRANSTHORACIC

## 2025-06-16 DIAGNOSIS — M25.561 RIGHT KNEE PAIN, UNSPECIFIED CHRONICITY: Primary | ICD-10-CM

## 2025-06-16 DIAGNOSIS — M25.562 LEFT KNEE PAIN, UNSPECIFIED CHRONICITY: ICD-10-CM

## 2025-07-01 DIAGNOSIS — M25.561 RIGHT KNEE PAIN, UNSPECIFIED CHRONICITY: ICD-10-CM

## 2025-07-01 DIAGNOSIS — M25.562 LEFT KNEE PAIN, UNSPECIFIED CHRONICITY: Primary | ICD-10-CM

## 2025-07-08 ENCOUNTER — OFFICE VISIT (OUTPATIENT)
Dept: ORTHOPEDIC SURGERY | Age: 50
End: 2025-07-08
Payer: MEDICAID

## 2025-07-08 VITALS — WEIGHT: 225 LBS | HEIGHT: 70 IN | BODY MASS INDEX: 32.21 KG/M2

## 2025-07-08 DIAGNOSIS — M17.12 PRIMARY OSTEOARTHRITIS OF LEFT KNEE: ICD-10-CM

## 2025-07-08 DIAGNOSIS — M17.11 PRIMARY OSTEOARTHRITIS OF RIGHT KNEE: Primary | ICD-10-CM

## 2025-07-08 PROCEDURE — 20610 DRAIN/INJ JOINT/BURSA W/O US: CPT | Performed by: ORTHOPAEDIC SURGERY

## 2025-07-08 PROCEDURE — 99203 OFFICE O/P NEW LOW 30 MIN: CPT | Performed by: ORTHOPAEDIC SURGERY

## 2025-07-08 PROCEDURE — G8427 DOCREV CUR MEDS BY ELIG CLIN: HCPCS | Performed by: ORTHOPAEDIC SURGERY

## 2025-07-08 PROCEDURE — 1036F TOBACCO NON-USER: CPT | Performed by: ORTHOPAEDIC SURGERY

## 2025-07-08 PROCEDURE — G8417 CALC BMI ABV UP PARAM F/U: HCPCS | Performed by: ORTHOPAEDIC SURGERY

## 2025-07-08 NOTE — PROGRESS NOTES
explained treatment options including surgical vs non surgical treatment. I reviewed in detail the risks and benefits and outlined the procedure in detail with expected outcomes and possible complications.  I also discussed non surgical treatment such as injections (CSI and visco supplementation), physical therapy, topical creams and NSAID's. They have elected for conservative management at this time.      I will proceed with a cortisone injection in the Bilateral knee.  Verbal and written consent was obtained for the injections. The skin was prepped with alcohol. 1mL of Kenalog 40mg and 9mL of 0.25% Marcaine was  injected to Bilateral knee. The injection was given through the lateral side of the knee. The patient tolerated the injection well. I will see the patient back as needed.    I will also get her braces for stability in bilateral knees.

## 2025-07-09 RX ORDER — TRIAMCINOLONE ACETONIDE 40 MG/ML
40 INJECTION, SUSPENSION INTRA-ARTICULAR; INTRAMUSCULAR ONCE
Status: COMPLETED | OUTPATIENT
Start: 2025-07-09 | End: 2025-07-09

## 2025-07-09 RX ADMIN — TRIAMCINOLONE ACETONIDE 40 MG: 40 INJECTION, SUSPENSION INTRA-ARTICULAR; INTRAMUSCULAR at 18:46

## 2025-08-12 ENCOUNTER — OFFICE VISIT (OUTPATIENT)
Dept: ORTHOPEDIC SURGERY | Age: 50
End: 2025-08-12
Payer: MEDICAID

## 2025-08-12 VITALS — BODY MASS INDEX: 32.21 KG/M2 | HEIGHT: 70 IN | WEIGHT: 225 LBS

## 2025-08-12 DIAGNOSIS — M19.011 GLENOHUMERAL ARTHRITIS, RIGHT: Primary | ICD-10-CM

## 2025-08-12 PROCEDURE — G8427 DOCREV CUR MEDS BY ELIG CLIN: HCPCS | Performed by: ORTHOPAEDIC SURGERY

## 2025-08-12 PROCEDURE — 1036F TOBACCO NON-USER: CPT | Performed by: ORTHOPAEDIC SURGERY

## 2025-08-12 PROCEDURE — G8417 CALC BMI ABV UP PARAM F/U: HCPCS | Performed by: ORTHOPAEDIC SURGERY

## 2025-08-12 PROCEDURE — 99213 OFFICE O/P EST LOW 20 MIN: CPT | Performed by: ORTHOPAEDIC SURGERY

## 2025-08-12 RX ORDER — CELECOXIB 200 MG/1
200 CAPSULE ORAL DAILY
Qty: 30 CAPSULE | Refills: 3 | Status: SHIPPED | OUTPATIENT
Start: 2025-08-12 | End: 2025-12-10

## 2025-08-22 ENCOUNTER — OFFICE VISIT (OUTPATIENT)
Dept: ORTHOPEDIC SURGERY | Age: 50
End: 2025-08-22

## 2025-08-22 DIAGNOSIS — M17.12 PRIMARY OSTEOARTHRITIS OF LEFT KNEE: Primary | ICD-10-CM

## 2025-08-22 DIAGNOSIS — M17.11 PRIMARY OSTEOARTHRITIS OF RIGHT KNEE: ICD-10-CM

## 2025-08-22 RX ORDER — BUPRENORPHINE HYDROCHLORIDE AND NALOXONE HYDROCHLORIDE DIHYDRATE 8; 2 MG/1; MG/1
1 TABLET SUBLINGUAL 2 TIMES DAILY
COMMUNITY

## (undated) DEVICE — PACK,LAPAROTOMY,NO GOWNS: Brand: MEDLINE

## (undated) DEVICE — DOUBLE BASIN SET: Brand: MEDLINE INDUSTRIES, INC.

## (undated) DEVICE — MARKER,SKIN,WI/RULER AND LABELS: Brand: MEDLINE

## (undated) DEVICE — Device

## (undated) DEVICE — PATIENT RETURN ELECTRODE, SINGLE-USE, CONTACT QUALITY MONITORING, ADULT, WITH 9FT CORD, FOR PATIENTS WEIGING OVER 33LBS. (15KG): Brand: MEGADYNE

## (undated) DEVICE — STRIP,CLOSURE,WOUND,MEDI-STRIP,1/2X4: Brand: MEDLINE

## (undated) DEVICE — ELECTROSURGICAL PENCIL BUTTON SWITCH E-Z CLEAN COATED BLADE ELECTRODE 10 FT (3 M) CORD HOLSTER: Brand: MEGADYNE

## (undated) DEVICE — COVER,LIGHT HANDLE,FLX,1/PK: Brand: MEDLINE INDUSTRIES, INC.

## (undated) DEVICE — GOWN,SIRUS,NONRNF,SETINSLV,XL,20/CS: Brand: MEDLINE

## (undated) DEVICE — SPONGE LAP W18XL18IN WHT COT 4 PLY FLD STRUNG RADPQ DISP ST

## (undated) DEVICE — PAD MATERNITY CURITY ADH STRIP DISP

## (undated) DEVICE — TUBING, SUCTION, 1/4" X 10', STRAIGHT: Brand: MEDLINE

## (undated) DEVICE — YANKAUER,POOLE TIP,STERILE,50/CS: Brand: MEDLINE

## (undated) DEVICE — PAD,NON-ADHERENT,3X8,STERILE,LF,1/PK: Brand: MEDLINE

## (undated) DEVICE — TOTAL TRAY, 16FR 10ML SIL FOLEY, URN: Brand: MEDLINE

## (undated) DEVICE — INTENDED FOR TISSUE SEPARATION, AND OTHER PROCEDURES THAT REQUIRE A SHARP SURGICAL BLADE TO PUNCTURE OR CUT.: Brand: BARD-PARKER ® STAINLESS STEEL BLADES

## (undated) DEVICE — GAUZE,SPONGE,4"X4",16PLY,XRAY,STRL,LF: Brand: MEDLINE

## (undated) DEVICE — TOWEL,OR,DSP,ST,BLUE,STD,6/PK,12PK/CS: Brand: MEDLINE

## (undated) DEVICE — PAD,ABDOMINAL,5"X9",ST,LF,25/BX: Brand: MEDLINE INDUSTRIES, INC.

## (undated) DEVICE — GENERATOR ELECSURG FORCETRAID

## (undated) DEVICE — DISCONTINUED USE 390648 LIGASURE REPROCESS IMPACT 13.5MMX18CM

## (undated) DEVICE — STAPLER SKIN SQ 30 ABSRB STPL DISP INSORB

## (undated) DEVICE — SET MAJOR INSTR HOUSE

## (undated) DEVICE — EXTRAS MEHTA

## (undated) DEVICE — READY WET SKIN SCRUB TRAY-LF: Brand: MEDLINE INDUSTRIES, INC.

## (undated) DEVICE — NDL CNTR 40CT FM MAG: Brand: MEDLINE INDUSTRIES, INC.

## (undated) DEVICE — GARMENT,MEDLINE,DVT,INT,CALF,MED, GEN2: Brand: MEDLINE

## (undated) DEVICE — MAGNETIC DRAPE: Brand: DEVON

## (undated) DEVICE — GAUZE,SPONGE,4"X4",16PLY,STRL,LF,10/TRAY: Brand: MEDLINE

## (undated) DEVICE — GLOVE ORANGE PI 7 1/2   MSG9075